# Patient Record
Sex: FEMALE | Race: WHITE | NOT HISPANIC OR LATINO | Employment: OTHER | URBAN - METROPOLITAN AREA
[De-identification: names, ages, dates, MRNs, and addresses within clinical notes are randomized per-mention and may not be internally consistent; named-entity substitution may affect disease eponyms.]

---

## 2022-09-15 ENCOUNTER — HOSPITAL ENCOUNTER (EMERGENCY)
Facility: CLINIC | Age: 87
Discharge: HOME | End: 2022-09-15
Attending: STUDENT IN AN ORGANIZED HEALTH CARE EDUCATION/TRAINING PROGRAM | Admitting: STUDENT IN AN ORGANIZED HEALTH CARE EDUCATION/TRAINING PROGRAM
Payer: COMMERCIAL

## 2022-09-15 VITALS
HEIGHT: 61 IN | BODY MASS INDEX: 21.71 KG/M2 | RESPIRATION RATE: 20 BRPM | HEART RATE: 109 BPM | SYSTOLIC BLOOD PRESSURE: 149 MMHG | OXYGEN SATURATION: 97 % | DIASTOLIC BLOOD PRESSURE: 86 MMHG | TEMPERATURE: 96.6 F | WEIGHT: 115 LBS

## 2022-09-15 DIAGNOSIS — R04.0 EPISTAXIS: ICD-10-CM

## 2022-09-15 LAB
ABO/RH(D): NORMAL
ANION GAP SERPL CALCULATED.3IONS-SCNC: 10 MMOL/L (ref 5–18)
ANTIBODY SCREEN: NEGATIVE
APTT PPP: 40 SECONDS (ref 22–38)
BASOPHILS # BLD AUTO: 0 10E3/UL (ref 0–0.2)
BASOPHILS NFR BLD AUTO: 0 %
BUN SERPL-MCNC: 16 MG/DL (ref 8–28)
CALCIUM SERPL-MCNC: 10.3 MG/DL (ref 8.5–10.5)
CHLORIDE BLD-SCNC: 103 MMOL/L (ref 98–107)
CO2 SERPL-SCNC: 25 MMOL/L (ref 22–31)
CREAT SERPL-MCNC: 0.81 MG/DL (ref 0.6–1.1)
EOSINOPHIL # BLD AUTO: 0 10E3/UL (ref 0–0.7)
EOSINOPHIL NFR BLD AUTO: 0 %
ERYTHROCYTE [DISTWIDTH] IN BLOOD BY AUTOMATED COUNT: 13.2 % (ref 10–15)
GFR SERPL CREATININE-BSD FRML MDRD: 71 ML/MIN/1.73M2
GLUCOSE BLD-MCNC: 102 MG/DL (ref 70–125)
HCT VFR BLD AUTO: 37.9 % (ref 35–47)
HGB BLD-MCNC: 12.7 G/DL (ref 11.7–15.7)
IMM GRANULOCYTES # BLD: 0.1 10E3/UL
IMM GRANULOCYTES NFR BLD: 1 %
INR PPP: 2.18 (ref 0.85–1.15)
LYMPHOCYTES # BLD AUTO: 0.9 10E3/UL (ref 0.8–5.3)
LYMPHOCYTES NFR BLD AUTO: 10 %
MCH RBC QN AUTO: 30.6 PG (ref 26.5–33)
MCHC RBC AUTO-ENTMCNC: 33.5 G/DL (ref 31.5–36.5)
MCV RBC AUTO: 91 FL (ref 78–100)
MONOCYTES # BLD AUTO: 0.7 10E3/UL (ref 0–1.3)
MONOCYTES NFR BLD AUTO: 8 %
NEUTROPHILS # BLD AUTO: 7.6 10E3/UL (ref 1.6–8.3)
NEUTROPHILS NFR BLD AUTO: 81 %
NRBC # BLD AUTO: 0 10E3/UL
NRBC BLD AUTO-RTO: 0 /100
PLATELET # BLD AUTO: 297 10E3/UL (ref 150–450)
POTASSIUM BLD-SCNC: 3.5 MMOL/L (ref 3.5–5)
RBC # BLD AUTO: 4.15 10E6/UL (ref 3.8–5.2)
SODIUM SERPL-SCNC: 138 MMOL/L (ref 136–145)
SPECIMEN EXPIRATION DATE: NORMAL
WBC # BLD AUTO: 9.4 10E3/UL (ref 4–11)

## 2022-09-15 PROCEDURE — 99284 EMERGENCY DEPT VISIT MOD MDM: CPT

## 2022-09-15 PROCEDURE — 86901 BLOOD TYPING SEROLOGIC RH(D): CPT | Performed by: STUDENT IN AN ORGANIZED HEALTH CARE EDUCATION/TRAINING PROGRAM

## 2022-09-15 PROCEDURE — 80048 BASIC METABOLIC PNL TOTAL CA: CPT | Performed by: STUDENT IN AN ORGANIZED HEALTH CARE EDUCATION/TRAINING PROGRAM

## 2022-09-15 PROCEDURE — 85730 THROMBOPLASTIN TIME PARTIAL: CPT | Performed by: STUDENT IN AN ORGANIZED HEALTH CARE EDUCATION/TRAINING PROGRAM

## 2022-09-15 PROCEDURE — 85610 PROTHROMBIN TIME: CPT | Performed by: STUDENT IN AN ORGANIZED HEALTH CARE EDUCATION/TRAINING PROGRAM

## 2022-09-15 PROCEDURE — 85004 AUTOMATED DIFF WBC COUNT: CPT | Performed by: STUDENT IN AN ORGANIZED HEALTH CARE EDUCATION/TRAINING PROGRAM

## 2022-09-15 PROCEDURE — 36415 COLL VENOUS BLD VENIPUNCTURE: CPT | Performed by: STUDENT IN AN ORGANIZED HEALTH CARE EDUCATION/TRAINING PROGRAM

## 2022-09-15 PROCEDURE — 30901 CONTROL OF NOSEBLEED: CPT | Mod: LT

## 2022-09-15 PROCEDURE — 250N000009 HC RX 250: Performed by: STUDENT IN AN ORGANIZED HEALTH CARE EDUCATION/TRAINING PROGRAM

## 2022-09-15 RX ORDER — OXYMETAZOLINE HYDROCHLORIDE 0.05 G/100ML
2 SPRAY NASAL ONCE
Status: COMPLETED | OUTPATIENT
Start: 2022-09-15 | End: 2022-09-15

## 2022-09-15 RX ORDER — TRANEXAMIC ACID 100 MG/ML
INJECTION, SOLUTION INTRAVENOUS ONCE
Status: COMPLETED | OUTPATIENT
Start: 2022-09-15 | End: 2022-09-15

## 2022-09-15 RX ORDER — LIDOCAINE HYDROCHLORIDE 20 MG/ML
10 SOLUTION OROPHARYNGEAL ONCE
Status: COMPLETED | OUTPATIENT
Start: 2022-09-15 | End: 2022-09-15

## 2022-09-15 RX ADMIN — LIDOCAINE HYDROCHLORIDE 10 ML: 20 SOLUTION ORAL at 11:42

## 2022-09-15 RX ADMIN — OXYMETAZOLINE HYDROCHLORIDE 2 SPRAY: 0.05 SPRAY NASAL at 11:42

## 2022-09-15 RX ADMIN — TRANEXAMIC ACID 1000 MG: 1 INJECTION, SOLUTION INTRAVENOUS at 11:41

## 2022-09-15 ASSESSMENT — ACTIVITIES OF DAILY LIVING (ADL): ADLS_ACUITY_SCORE: 33

## 2022-09-15 NOTE — ED PROVIDER NOTES
Emergency Department Encounter         FINAL IMPRESSION:  Epistaxis        ED COURSE AND MEDICAL DECISION MAKING   10:10 AM I met with the patient to gather history and to perform my initial exam. We discussed plans for the ED course, including diagnostic testing and treatment.    11:15 AM Rechecked and updated patient.   11:54 AM Rechecked and updated patient. Patient's nosebleed has stopped. I discussed plans for discharge with the patient, which they were agreeable to. We discussed supportive cares at home and reasons for return to the ER including new or worsening symptoms. All questions and concerns were addressed. Patient to be discharged by RN.       ED Course as of 09/15/22 1155   Thu Sep 15, 2022   1014 87-year-old on Coumadin here with nosebleed that began at 3 AM this morning approximately 7 hours prior to arrival.  Went to urgent care initially then sent her here because they did not had any capabilities to stop the nosebleed.  Arrival her bleeding has slowed.  Nasal clamp in place.  No signs of posterior bleed.  Clamp was taken down with no active bleeding.  We will watch her.  If she starts bleeding again, we will start topical medications.       -Cottonball soaked in Afrin, TXA and lidocaine placed in left nare.  Improvement and cessation of patient's nosebleed.  No signs of posterior bleed.  Multiple repeat evaluations of patient showing her resting comfortably.  Plan for ENT follow-up as an outpatient.  Repeat examination showing a small ulceration on the left middle half of the septum.  No active bleeding.  Offered cauterization patient declined.            At the conclusion of the encounter I discussed the results of all the tests and the disposition. The questions were answered. The patient or family acknowledged understanding and was agreeable with the care plan.              MEDICATIONS GIVEN IN THE EMERGENCY DEPARTMENT:  Medications   tranexamic acid (CYKLOKAPRON) TOPICAL (injection used  topically) (1,000 mg Topical Given 9/15/22 1141)   oxymetazoline (AFRIN) 0.05 % spray 2 spray (2 sprays Nasal Given 9/15/22 1142)   lidocaine (viscous) (XYLOCAINE) 2 % solution 10 mL (10 mLs Topical Given 9/15/22 1142)       NEW PRESCRIPTIONS STARTED AT TODAY'S ED VISIT:  New Prescriptions    No medications on file       HPI     Patient information obtained from: Patient    Use of Interpretor: N/A     Modesta Tejeda is a 87 year old female with a pertinent medical history of aortic stenosis, s/p aortic valve replacement, paroxysmal atrial fibrillation, essential HTN, HLD, who presents to this ED via walk-in for evaluation of epistaxis.     Per Chart Review, patient was seen at [facility] earlier today for evaluation of acute epistaxis, primarily from left nare approximately since 3:00 AM. Patient reported she is on Coumadin. Provider at Urgent Care attempted to get patient into ENT here in clinic for cautery but none were available, so patient was discharged with instructions to immediately visit an ED.    Patient endorses the history above. Patient reports that she has a nosebleed that began at 3 AM this morning (approximately 7 hours prior to arrival).  She states she went to Urgent Care initially and then was sent her here because they did not have any capabilities to stop the nosebleed. She denies any recent headache, dizziness, or any other complications at this time.       REVIEW OF SYSTEMS:  Review of Systems   Constitutional: Negative for fever, malaise  HEENT: Positive for nosebleeds. Negative runny nose, sore throat, ear pain, neck pain  Respiratory: Negative for shortness of breath, cough, congestion  Cardiovascular: Negative for chest pain, leg edema  Gastrointestinal: Negative for abdominal distention, abdominal pain, constipation, vomiting, nausea, diarrhea  Genitourinary: Negative for dysuria and hematuria.   Integument: Negative for rash, skin breakdown  Neurological: Negative for  "paresthesias, weakness, headache, dizziness.  Musculoskeletal: Negative for joint pain, joint swelling      All other systems reviewed and are negative.          MEDICAL HISTORY     History reviewed. No pertinent past medical history.    History reviewed. No pertinent surgical history.         No current outpatient medications on file.          PHYSICAL EXAM     BP (!) 149/86   Pulse 109   Temp (!) 96.6  F (35.9  C) (Temporal)   Resp 20   Ht 1.549 m (5' 1\")   Wt 52.2 kg (115 lb)   SpO2 97%   BMI 21.73 kg/m        PHYSICAL EXAM:     General: Patient appears well, nontoxic, comfortable  HEENT:  Nasal clamp in place. No signs of posterior bleed. Clamp was taken down with no active bleeding. Moist mucous membranes,  No head trauma.  No midline neck pain.  Small punctate ulceration noted on the mid half of the nasal septum.  Cardiovascular: Normal rate, normal rhythm, no extremity edema.  No appreciable murmur.  Respiratory: No signs of respiratory distress, lungs are clear to auscultation bilaterally with no wheezes rhonchi or rales.  Abdominal: Soft, nontender, nondistended, no palpable masses, no guarding, no rebound  Musculoskeletal: Full range of motion of joints, no deformities appreciated.  Neurological: Alert and oriented, grossly neurologically intact.  Psychological: Normal affect and mood.  Integument: No rashes appreciated          RESULTS       Labs Ordered and Resulted from Time of ED Arrival to Time of ED Departure   INR - Abnormal       Result Value    INR 2.18 (*)    PARTIAL THROMBOPLASTIN TIME - Abnormal    aPTT 40 (*)    BASIC METABOLIC PANEL - Normal    Sodium 138      Potassium 3.5      Chloride 103      Carbon Dioxide (CO2) 25      Anion Gap 10      Urea Nitrogen 16      Creatinine 0.81      Calcium 10.3      Glucose 102      GFR Estimate 71     CBC WITH PLATELETS AND DIFFERENTIAL    WBC Count 9.4      RBC Count 4.15      Hemoglobin 12.7      Hematocrit 37.9      MCV 91      MCH 30.6      " MCHC 33.5      RDW 13.2      Platelet Count 297      % Neutrophils 81      % Lymphocytes 10      % Monocytes 8      % Eosinophils 0      % Basophils 0      % Immature Granulocytes 1      NRBCs per 100 WBC 0      Absolute Neutrophils 7.6      Absolute Lymphocytes 0.9      Absolute Monocytes 0.7      Absolute Eosinophils 0.0      Absolute Basophils 0.0      Absolute Immature Granulocytes 0.1      Absolute NRBCs 0.0     TYPE AND SCREEN, ADULT   ABO/RH TYPE AND SCREEN       No orders to display           PROCEDURES:  Procedures:  [facility]    -Epistaxis Mgmt    Date/Time: 9/15/2022 10:53 AM  Performed by: Benji Cook DO  Authorized by: Benji Cook DO     Risks, benefits and alternatives discussed.      ANESTHESIA (see MAR for exact dosages)     Anesthesia method:  None  PROCEDURE DETAILS     Treatment site:  L anterior    Treatment complexity:  Limited    Treatment episode: recurrence of recent bleed        POST PROCEDURE     Assessment:  Bleeding stopped    PROCEDURE  Describe Procedure: TXA, Afrin and lidocaine soaked cottonball placed in left nare  Patient Tolerance:  Patient tolerated the procedure well with no immediate complications         I, Bala Pena am serving as a scribe to document services personally performed by Benji Cook DO, based on my observations and the provider's statements to me.  I, Benji Cook DO, attest that Bala Pena is acting in a scribe capacity, has observed my performance of the services and has documented them in accordance with my direction.    Benji Cook DO  Emergency Medicine  [facility] EMERGENCY ROOM       Benji Cook DO  09/15/22 1158

## 2023-04-10 ENCOUNTER — APPOINTMENT (OUTPATIENT)
Dept: CT IMAGING | Facility: CLINIC | Age: 88
End: 2023-04-10
Attending: EMERGENCY MEDICINE
Payer: COMMERCIAL

## 2023-04-10 ENCOUNTER — APPOINTMENT (OUTPATIENT)
Dept: RADIOLOGY | Facility: CLINIC | Age: 88
End: 2023-04-10
Attending: EMERGENCY MEDICINE
Payer: COMMERCIAL

## 2023-04-10 ENCOUNTER — HOSPITAL ENCOUNTER (EMERGENCY)
Facility: CLINIC | Age: 88
Discharge: HOME | End: 2023-04-10
Attending: EMERGENCY MEDICINE | Admitting: EMERGENCY MEDICINE
Payer: COMMERCIAL

## 2023-04-10 VITALS
BODY MASS INDEX: 24.3 KG/M2 | HEART RATE: 86 BPM | RESPIRATION RATE: 28 BRPM | OXYGEN SATURATION: 97 % | HEIGHT: 55 IN | SYSTOLIC BLOOD PRESSURE: 157 MMHG | TEMPERATURE: 97.6 F | DIASTOLIC BLOOD PRESSURE: 72 MMHG | WEIGHT: 105 LBS

## 2023-04-10 DIAGNOSIS — I71.43 INFRARENAL ABDOMINAL AORTIC ANEURYSM (AAA) WITHOUT RUPTURE (H): ICD-10-CM

## 2023-04-10 DIAGNOSIS — R10.9 FLANK PAIN: ICD-10-CM

## 2023-04-10 LAB
ALBUMIN SERPL-MCNC: 4.2 G/DL (ref 3.5–5)
ALBUMIN UR-MCNC: 30 MG/DL
ALP SERPL-CCNC: 45 U/L (ref 45–120)
ALT SERPL W P-5'-P-CCNC: 12 U/L (ref 0–45)
ANION GAP SERPL CALCULATED.3IONS-SCNC: 9 MMOL/L (ref 5–18)
APPEARANCE UR: CLEAR
AST SERPL W P-5'-P-CCNC: 12 U/L (ref 0–40)
ATRIAL RATE - MUSE: 90 BPM
BASOPHILS # BLD AUTO: 0 10E3/UL (ref 0–0.2)
BASOPHILS NFR BLD AUTO: 0 %
BILIRUB SERPL-MCNC: 1.2 MG/DL (ref 0–1)
BILIRUB UR QL STRIP: NEGATIVE
BUN SERPL-MCNC: 17 MG/DL (ref 8–28)
CALCIUM SERPL-MCNC: 9.9 MG/DL (ref 8.5–10.5)
CHLORIDE BLD-SCNC: 102 MMOL/L (ref 98–107)
CO2 SERPL-SCNC: 28 MMOL/L (ref 22–31)
COLOR UR AUTO: YELLOW
CREAT SERPL-MCNC: 0.72 MG/DL (ref 0.6–1.1)
DIASTOLIC BLOOD PRESSURE - MUSE: 88 MMHG
EOSINOPHIL # BLD AUTO: 0 10E3/UL (ref 0–0.7)
EOSINOPHIL NFR BLD AUTO: 0 %
ERYTHROCYTE [DISTWIDTH] IN BLOOD BY AUTOMATED COUNT: 13.2 % (ref 10–15)
GFR SERPL CREATININE-BSD FRML MDRD: 81 ML/MIN/1.73M2
GLUCOSE BLD-MCNC: 119 MG/DL (ref 70–125)
GLUCOSE UR STRIP-MCNC: NEGATIVE MG/DL
HCT VFR BLD AUTO: 40.4 % (ref 35–47)
HGB BLD-MCNC: 13 G/DL (ref 11.7–15.7)
HGB UR QL STRIP: NEGATIVE
HYALINE CASTS: 1 /LPF
IMM GRANULOCYTES # BLD: 0 10E3/UL
IMM GRANULOCYTES NFR BLD: 0 %
INR PPP: 2.08 (ref 0.85–1.15)
INTERPRETATION ECG - MUSE: NORMAL
KETONES UR STRIP-MCNC: 20 MG/DL
LEUKOCYTE ESTERASE UR QL STRIP: ABNORMAL
LIPASE SERPL-CCNC: 11 U/L (ref 0–52)
LYMPHOCYTES # BLD AUTO: 0.4 10E3/UL (ref 0.8–5.3)
LYMPHOCYTES NFR BLD AUTO: 6 %
MCH RBC QN AUTO: 30.6 PG (ref 26.5–33)
MCHC RBC AUTO-ENTMCNC: 32.2 G/DL (ref 31.5–36.5)
MCV RBC AUTO: 95 FL (ref 78–100)
MONOCYTES # BLD AUTO: 0.3 10E3/UL (ref 0–1.3)
MONOCYTES NFR BLD AUTO: 4 %
MUCOUS THREADS #/AREA URNS LPF: PRESENT /LPF
NEUTROPHILS # BLD AUTO: 6.4 10E3/UL (ref 1.6–8.3)
NEUTROPHILS NFR BLD AUTO: 90 %
NITRATE UR QL: NEGATIVE
NRBC # BLD AUTO: 0 10E3/UL
NRBC BLD AUTO-RTO: 0 /100
P AXIS - MUSE: NORMAL DEGREES
PH UR STRIP: 6.5 [PH] (ref 5–7)
PLATELET # BLD AUTO: 249 10E3/UL (ref 150–450)
POTASSIUM BLD-SCNC: 3.3 MMOL/L (ref 3.5–5)
PR INTERVAL - MUSE: NORMAL MS
PROT SERPL-MCNC: 6.7 G/DL (ref 6–8)
QRS DURATION - MUSE: 126 MS
QT - MUSE: 408 MS
QTC - MUSE: 504 MS
R AXIS - MUSE: -77 DEGREES
RBC # BLD AUTO: 4.25 10E6/UL (ref 3.8–5.2)
RBC URINE: 0 /HPF
SODIUM SERPL-SCNC: 139 MMOL/L (ref 136–145)
SP GR UR STRIP: 1.03 (ref 1–1.03)
SQUAMOUS EPITHELIAL: 1 /HPF
SYSTOLIC BLOOD PRESSURE - MUSE: 160 MMHG
T AXIS - MUSE: 91 DEGREES
TROPONIN I SERPL-MCNC: 0.01 NG/ML (ref 0–0.29)
UROBILINOGEN UR STRIP-MCNC: <2 MG/DL
VENTRICULAR RATE- MUSE: 92 BPM
WBC # BLD AUTO: 7.1 10E3/UL (ref 4–11)
WBC URINE: 2 /HPF

## 2023-04-10 PROCEDURE — 96360 HYDRATION IV INFUSION INIT: CPT | Mod: 59

## 2023-04-10 PROCEDURE — 36415 COLL VENOUS BLD VENIPUNCTURE: CPT | Performed by: EMERGENCY MEDICINE

## 2023-04-10 PROCEDURE — 99285 EMERGENCY DEPT VISIT HI MDM: CPT | Mod: 25

## 2023-04-10 PROCEDURE — 250N000013 HC RX MED GY IP 250 OP 250 PS 637: Performed by: EMERGENCY MEDICINE

## 2023-04-10 PROCEDURE — 84484 ASSAY OF TROPONIN QUANT: CPT | Performed by: EMERGENCY MEDICINE

## 2023-04-10 PROCEDURE — 71275 CT ANGIOGRAPHY CHEST: CPT

## 2023-04-10 PROCEDURE — 71046 X-RAY EXAM CHEST 2 VIEWS: CPT

## 2023-04-10 PROCEDURE — 74177 CT ABD & PELVIS W/CONTRAST: CPT

## 2023-04-10 PROCEDURE — 250N000011 HC RX IP 250 OP 636: Performed by: EMERGENCY MEDICINE

## 2023-04-10 PROCEDURE — 93005 ELECTROCARDIOGRAM TRACING: CPT | Performed by: EMERGENCY MEDICINE

## 2023-04-10 PROCEDURE — 258N000003 HC RX IP 258 OP 636: Performed by: EMERGENCY MEDICINE

## 2023-04-10 PROCEDURE — 81001 URINALYSIS AUTO W/SCOPE: CPT | Performed by: EMERGENCY MEDICINE

## 2023-04-10 PROCEDURE — 80053 COMPREHEN METABOLIC PANEL: CPT | Performed by: EMERGENCY MEDICINE

## 2023-04-10 PROCEDURE — 85004 AUTOMATED DIFF WBC COUNT: CPT | Performed by: EMERGENCY MEDICINE

## 2023-04-10 PROCEDURE — 85610 PROTHROMBIN TIME: CPT | Performed by: EMERGENCY MEDICINE

## 2023-04-10 PROCEDURE — 83690 ASSAY OF LIPASE: CPT | Performed by: EMERGENCY MEDICINE

## 2023-04-10 RX ORDER — LIDOCAINE 4 G/G
1 PATCH TOPICAL ONCE
Status: DISCONTINUED | OUTPATIENT
Start: 2023-04-10 | End: 2023-04-10 | Stop reason: HOSPADM

## 2023-04-10 RX ORDER — OXYCODONE HYDROCHLORIDE 5 MG/1
5 TABLET ORAL ONCE
Status: COMPLETED | OUTPATIENT
Start: 2023-04-10 | End: 2023-04-10

## 2023-04-10 RX ORDER — ACETAMINOPHEN 325 MG/1
650 TABLET ORAL ONCE
Status: COMPLETED | OUTPATIENT
Start: 2023-04-10 | End: 2023-04-10

## 2023-04-10 RX ORDER — IOPAMIDOL 755 MG/ML
100 INJECTION, SOLUTION INTRAVASCULAR ONCE
Status: COMPLETED | OUTPATIENT
Start: 2023-04-10 | End: 2023-04-10

## 2023-04-10 RX ORDER — CYCLOBENZAPRINE HCL 10 MG
10 TABLET ORAL 3 TIMES DAILY PRN
Qty: 15 TABLET | Refills: 0 | Status: SHIPPED | OUTPATIENT
Start: 2023-04-10 | End: 2024-02-08

## 2023-04-10 RX ADMIN — ACETAMINOPHEN 650 MG: 325 TABLET ORAL at 11:50

## 2023-04-10 RX ADMIN — SODIUM CHLORIDE 500 ML: 9 INJECTION, SOLUTION INTRAVENOUS at 12:22

## 2023-04-10 RX ADMIN — LIDOCAINE 1 PATCH: 246 PATCH TOPICAL at 14:31

## 2023-04-10 RX ADMIN — OXYCODONE HYDROCHLORIDE 5 MG: 5 TABLET ORAL at 13:31

## 2023-04-10 RX ADMIN — IOPAMIDOL 100 ML: 755 INJECTION, SOLUTION INTRAVENOUS at 13:09

## 2023-04-10 ASSESSMENT — ACTIVITIES OF DAILY LIVING (ADL)
ADLS_ACUITY_SCORE: 35

## 2023-04-10 ASSESSMENT — ENCOUNTER SYMPTOMS
VOMITING: 0
ABDOMINAL PAIN: 1
CHILLS: 0
DYSURIA: 0
FEVER: 0
DIARRHEA: 0
NAUSEA: 0

## 2023-04-10 NOTE — ED PROVIDER NOTES
Emergency Department Encounter     Evaluation Date & Time:   4/10/2023 11:00 AM    CHIEF COMPLAINT:  Flank Pain      Triage Note:     FINAL IMPRESSION:    ICD-10-CM    1. Flank pain  R10.9       2. Infrarenal abdominal aortic aneurysm (AAA) without rupture (H)  I71.43           Impression and Plan     ED COURSE & MEDICAL DECISION MAKIN:16 AM I met with patient for initial interview and encounter. PPE worn includes exam gloves and N95 mask.     ED Course as of 04/10/23 1444   Mon Apr 10, 2023   1217 She has right flank pain.  No clear cause of her pain on her x-ray.  It does seem to be musculoskeletal as she is tender to palpation in the area and there is no associated rash to suggest shingles.  However, there is no bruising or muscle spasm that is palpated there.  So, if her x-ray does not show an obvious fractured rib from her fall a week ago, I would pursue CT imaging to see if there is bleeding either around the liver or in that lower lung related to her warfarin usage.  Less likely that this is cardiac ischemia given the reproducible nature more likely it is musculoskeletal.  Blood work ordered to look at liver function and blood count as well as troponin for possible cardiac source.  If CT imaging is unremarkable then will likely discharge home with musculoskeletal flank pain.  Otherwise she has no infectious symptoms to suggest a pneumonia and no signs of fluid overload to suggest pleural effusion though spontaneous hemothorax would be possible even if related to her fall a week ago.  No midline spine pain to suggest spinal fracture and as reproducible to palpation unlikely to be due to radiculopathy from pinched nerve.   1438 I spoke with Dr. Steele with vascular surgery.  It discussed the CT findings.  Unlikely to be causing musculoskeletal pain that is tender to palpation and at her age with a small of an aneurysm no specific follow-up is needed just continue to work with her primary care  physician obviously on blood pressure control.  Since the oxycodone did not help her and only made her sleepy I think doing more muscle pain patches and muscle relaxer would be better.  Wrote for a prescription for Flexeril and she will follow-up as an outpatient.   1439 Her INR is pending but was normal 4 days ago.  She can continue to follow up with her anticoagulation clinic on that.   1444 Inr already back and good.  No changes needed.       At the conclusion of the encounter I discussed the results of all the tests and the disposition. The questions were answered. The patient or family acknowledged understanding and was agreeable with the care plan.          0 minutes of critical care time        MEDICATIONS GIVEN IN THE EMERGENCY DEPARTMENT:  Medications   Lidocaine (LIDOCARE) 4 % Patch 1 patch (1 patch Transdermal $Patch/Med Applied 4/10/23 1431)   lidocaine patch in PLACE (has no administration in time range)   0.9% sodium chloride BOLUS (0 mLs Intravenous Stopped 4/10/23 1317)   acetaminophen (TYLENOL) tablet 650 mg (650 mg Oral $Given 4/10/23 1150)   iopamidol (ISOVUE-370) solution 100 mL (100 mLs Intravenous $Given 4/10/23 1309)   oxyCODONE (ROXICODONE) tablet 5 mg (5 mg Oral $Given 4/10/23 1331)       NEW PRESCRIPTIONS STARTED AT TODAY'S ED VISIT:  New Prescriptions    CYCLOBENZAPRINE (FLEXERIL) 10 MG TABLET    Take 1 tablet (10 mg) by mouth 3 times daily as needed for muscle spasms       HPI     HPI     Modesta Tejeda is a 88 year old female with a pertinent history of GERD, HLD, paroxysmal Atrial fibrillation, s/p AVR, who presents to this ED via EMS for evaluation of abdominal pain.     The patient started experiencing right upper quadrant since yesterday.The pain wraps to her back and is gradually getting worse. She received 50 mcg fentanyl en route and another 25 mcg fentanyl when arrived to ED. She explains that the pain is subtle at rest but aggravates with movement such as getting up from a laying  "position. Her last bowel movement was yesterday which was normal. She is anticoagulated on Warfin, last INR check was on 03/06. Denies drinking alcohol. The patient reports she felled 2 weeks ago when her foot went the wrong way when she was trying to avoid a bookshelf. The patient denies dysuria, fever, chills, nausea, vomiting, diarrhea, and any other complaints or concerns at the moment.     REVIEW OF SYSTEMS:  Review of Systems   Constitutional: Negative for chills and fever.   Gastrointestinal: Positive for abdominal pain (RUQ radiating to back. Worse with movement). Negative for diarrhea, nausea and vomiting.   Genitourinary: Negative for dysuria.   All other systems reviewed and are negative.    remainder of systems are all otherwise negative.        Medical History     History reviewed. No pertinent past medical history.    History reviewed. No pertinent surgical history.    No family history on file.         cyclobenzaprine (FLEXERIL) 10 MG tablet        Physical Exam     First Vitals:  Patient Vitals for the past 24 hrs:   BP Temp Temp src Pulse Resp SpO2 Height Weight   04/10/23 1335 (!) 157/72 -- -- 86 28 97 % -- --   04/10/23 1330 136/66 -- -- 82 20 94 % -- --   04/10/23 1300 -- -- -- 84 -- 97 % -- --   04/10/23 1143 134/85 -- -- 88 20 97 % -- --   04/10/23 1126 -- -- -- -- -- -- 0.53 m (1' 8.87\") 47.6 kg (105 lb)   04/10/23 1113 (!) 150/90 97.6  F (36.4  C) Oral 84 20 98 % -- --   04/10/23 1110 139/77 -- -- -- -- -- -- --       PHYSICAL EXAM:   Constitutional:   Well-appearing, nontoxic, cooperative and drowsy.    HENT:  Normocephalic, wearing mask with normal voice.   Eyes:  PERRL, EOMI, Conjunctiva normal, No discharge, no scleral icterus.  Respiratory:  Breathing easily, clear lungs to ausculation.   Cardiovascular:  Irregular heart rate with rate controlled. nl s1s2 0 murmurs, rubs, or gallops.  Peripheral pulses dp, pt, and radial are wnl.  No peripheral edema   GI:  Tenderness to palpation over " lower posterior rib area. No midline tenderness, nondistended.  :No CVA tenderness.   Musculoskeletal:  Moves all extremities.  No erythematous or swollen major joints,   Integument:  Normal skin color, no bruises or rashes.   Lymphatic:  No cervical lymphadenopathy  Neurologic:  Alert & oriented x 3, Normal motor function, Normal sensory function, No focal deficits noted. Normal speech.  Psychiatric:  Affect normal, Judgment normal, Mood normal.     Results     LAB AND RADIOLOGY:  All pertinent labs reviewed and interpreted  Results for orders placed or performed during the hospital encounter of 04/10/23   XR Chest 2 Views     Status: None    Narrative    EXAM: XR CHEST 2 VIEWS  LOCATION: [facility]  DATE/TIME: 4/10/2023 12:01 PM    INDICATION: r posterior flank pain.  fall 1 week ago but pain only for 24 hours.  no cough infectious symptoms.  on blood thinner  COMPARISON: None.      Impression    IMPRESSION: The lungs are clear. The heart size and pulmonary vascularity are normal. No pneumothorax or pleural effusion. Postoperative change from median sternotomy and aortic valve replacement. Aortic root appears dilated. Degenerative change in the   thoracic spine with scoliosis at the thoracolumbar junction convex to the right.   CT Chest Pulmonary Embolism w Contrast     Status: None    Narrative    EXAM: CT CHEST PULMONARY EMBOLISM W CONTRAST, CT ABDOMEN PELVIS W CONTRAST  LOCATION: [facility]  DATE/TIME: 4/10/2023 1:20 PM    INDICATION: Right-sided chest and flank pain radiating to the back. 88-year-old woman who is anticoagulated on warfarin who had a fall several weeks ago.  COMPARISON: Today's 04/10/2023 chest radiograph.  TECHNIQUE: CT chest pulmonary angiogram and routine CT abdomen pelvis with IV contrast. Arterial phase through the chest and venous phase through the abdomen and pelvis. Multiplanar reformats and MIP reconstructions were performed. Dose  reduction   techniques were used.   CONTRAST: ISOVUE 370 100ML    FINDINGS:  ANGIOGRAM CHEST: Pulmonary arteries are normal caliber and negative for pulmonary emboli. Normal caliber thoracic aorta with no evidence of dissection. No CT evidence of right heart strain.     LUNGS AND PLEURA: Lungs are clear. No pleural effusion.    MEDIASTINUM/AXILLAE: No lymphadenopathy. Sternotomy. Mild biatrial cardiac enlargement with prior aortic valve replacement and moderate three-vessel coronary artery calcification. No pericardial effusion.    CORONARY ARTERY CALCIFICATION: Moderate coronary artery calcification.    HEPATOBILIARY: Liver is normal.  No calcified gallstones or bile duct dilatation.     PANCREAS: Normal.    SPLEEN: Spleen size normal.    ADRENAL GLANDS: Normal.    KIDNEY/BLADDER: Kidneys, ureters and bladder are normal.    BOWEL: Normal appendix. Colonic diverticulosis. Bowel is otherwise normal with no obstruction or inflammatory change. No free air. No free fluid.    LYMPH NODES: No lymphadenopathy.    VASCULATURE: Penetrating atheromatous ulcer right lateral wall mid infrarenal abdominal aorta with mild rightward focal saccular aneurysmal dilatation at about 3.0 cm ML, 2.5 cm AP over a length of 2.5 cm (series 6 axial images 70-77 and coronal images   30-33    PELVIC ORGANS: Hysterectomy. Pelvis otherwise unremarkable.    MUSCULOSKELETAL: No fracture or bone lesion. Bones are demineralized. Spondylotic change throughout the thoracolumbar spine.      Impression    IMPRESSION:   1.  Penetrating atheromatous ulcer right lateral wall mid infrarenal abdominal aorta with mild rightward focal saccular aneurysmal dilatation is of uncertain chronicity but could be acute/subacute. No CT sign of leakage/rupture. Vascular surgery   consultation and short interval follow-up dedicated arterial CTA without and with contrast material recommended. Discussed with Dr. Brito 1:55 PM 04/10/2023.  2.  No pulmonary emboli.  3.   Mild biatrial cardiac enlargement with prior aortic valve replacement and moderate three-vessel coronary artery calcification.   CT Abdomen Pelvis w Contrast     Status: None    Narrative    EXAM: CT CHEST PULMONARY EMBOLISM W CONTRAST, CT ABDOMEN PELVIS W CONTRAST  LOCATION: [facility]  DATE/TIME: 4/10/2023 1:20 PM    INDICATION: Right-sided chest and flank pain radiating to the back. 88-year-old woman who is anticoagulated on warfarin who had a fall several weeks ago.  COMPARISON: Today's 04/10/2023 chest radiograph.  TECHNIQUE: CT chest pulmonary angiogram and routine CT abdomen pelvis with IV contrast. Arterial phase through the chest and venous phase through the abdomen and pelvis. Multiplanar reformats and MIP reconstructions were performed. Dose reduction   techniques were used.   CONTRAST: ISOVUE 370 100ML    FINDINGS:  ANGIOGRAM CHEST: Pulmonary arteries are normal caliber and negative for pulmonary emboli. Normal caliber thoracic aorta with no evidence of dissection. No CT evidence of right heart strain.     LUNGS AND PLEURA: Lungs are clear. No pleural effusion.    MEDIASTINUM/AXILLAE: No lymphadenopathy. Sternotomy. Mild biatrial cardiac enlargement with prior aortic valve replacement and moderate three-vessel coronary artery calcification. No pericardial effusion.    CORONARY ARTERY CALCIFICATION: Moderate coronary artery calcification.    HEPATOBILIARY: Liver is normal.  No calcified gallstones or bile duct dilatation.     PANCREAS: Normal.    SPLEEN: Spleen size normal.    ADRENAL GLANDS: Normal.    KIDNEY/BLADDER: Kidneys, ureters and bladder are normal.    BOWEL: Normal appendix. Colonic diverticulosis. Bowel is otherwise normal with no obstruction or inflammatory change. No free air. No free fluid.    LYMPH NODES: No lymphadenopathy.    VASCULATURE: Penetrating atheromatous ulcer right lateral wall mid infrarenal abdominal aorta with mild rightward focal saccular  aneurysmal dilatation at about 3.0 cm ML, 2.5 cm AP over a length of 2.5 cm (series 6 axial images 70-77 and coronal images   30-33    PELVIC ORGANS: Hysterectomy. Pelvis otherwise unremarkable.    MUSCULOSKELETAL: No fracture or bone lesion. Bones are demineralized. Spondylotic change throughout the thoracolumbar spine.      Impression    IMPRESSION:   1.  Penetrating atheromatous ulcer right lateral wall mid infrarenal abdominal aorta with mild rightward focal saccular aneurysmal dilatation is of uncertain chronicity but could be acute/subacute. No CT sign of leakage/rupture. Vascular surgery   consultation and short interval follow-up dedicated arterial CTA without and with contrast material recommended. Discussed with Dr. Brito 1:55 PM 04/10/2023.  2.  No pulmonary emboli.  3.  Mild biatrial cardiac enlargement with prior aortic valve replacement and moderate three-vessel coronary artery calcification.   Comprehensive metabolic panel     Status: Abnormal   Result Value Ref Range    Sodium 139 136 - 145 mmol/L    Potassium 3.3 (L) 3.5 - 5.0 mmol/L    Chloride 102 98 - 107 mmol/L    Carbon Dioxide (CO2) 28 22 - 31 mmol/L    Anion Gap 9 5 - 18 mmol/L    Urea Nitrogen 17 8 - 28 mg/dL    Creatinine 0.72 0.60 - 1.10 mg/dL    Calcium 9.9 8.5 - 10.5 mg/dL    Glucose 119 70 - 125 mg/dL    Alkaline Phosphatase 45 45 - 120 U/L    AST 12 0 - 40 U/L    ALT 12 0 - 45 U/L    Protein Total 6.7 6.0 - 8.0 g/dL    Albumin 4.2 3.5 - 5.0 g/dL    Bilirubin Total 1.2 (H) 0.0 - 1.0 mg/dL    GFR Estimate 81 >60 mL/min/1.73m2   Lipase     Status: Normal   Result Value Ref Range    Lipase 11 0 - 52 U/L   Troponin I     Status: Normal   Result Value Ref Range    Troponin I 0.01 0.00 - 0.29 ng/mL   CBC with platelets and differential     Status: Abnormal   Result Value Ref Range    WBC Count 7.1 4.0 - 11.0 10e3/uL    RBC Count 4.25 3.80 - 5.20 10e6/uL    Hemoglobin 13.0 11.7 - 15.7 g/dL    Hematocrit 40.4 35.0 - 47.0 %    MCV 95 78 - 100  fL    MCH 30.6 26.5 - 33.0 pg    MCHC 32.2 31.5 - 36.5 g/dL    RDW 13.2 10.0 - 15.0 %    Platelet Count 249 150 - 450 10e3/uL    % Neutrophils 90 %    % Lymphocytes 6 %    % Monocytes 4 %    % Eosinophils 0 %    % Basophils 0 %    % Immature Granulocytes 0 %    NRBCs per 100 WBC 0 <1 /100    Absolute Neutrophils 6.4 1.6 - 8.3 10e3/uL    Absolute Lymphocytes 0.4 (L) 0.8 - 5.3 10e3/uL    Absolute Monocytes 0.3 0.0 - 1.3 10e3/uL    Absolute Eosinophils 0.0 0.0 - 0.7 10e3/uL    Absolute Basophils 0.0 0.0 - 0.2 10e3/uL    Absolute Immature Granulocytes 0.0 <=0.4 10e3/uL    Absolute NRBCs 0.0 10e3/uL   UA with Microscopic reflex to Culture     Status: Abnormal    Specimen: Urine, NOS   Result Value Ref Range    Color Urine Yellow Colorless, Straw, Light Yellow, Yellow    Appearance Urine Clear Clear    Glucose Urine Negative Negative mg/dL    Bilirubin Urine Negative Negative    Ketones Urine 20 (A) Negative mg/dL    Specific Gravity Urine 1.027 1.001 - 1.030    Blood Urine Negative Negative    pH Urine 6.5 5.0 - 7.0    Protein Albumin Urine 30 (A) Negative mg/dL    Urobilinogen Urine <2.0 <2.0 mg/dL    Nitrite Urine Negative Negative    Leukocyte Esterase Urine 25 Pete/uL (A) Negative    Mucus Urine Present (A) None Seen /LPF    RBC Urine 0 <=2 /HPF    WBC Urine 2 <=5 /HPF    Squamous Epithelials Urine 1 <=1 /HPF    Hyaline Casts Urine 1 <=2 /LPF    Narrative    Urine Culture not indicated   INR     Status: Abnormal   Result Value Ref Range    INR 2.08 (H) 0.85 - 1.15   CBC with platelets differential     Status: Abnormal    Narrative    The following orders were created for panel order CBC with platelets differential.  Procedure                               Abnormality         Status                     ---------                               -----------         ------                     CBC with platelets and d...[[id number]]  Abnormal            Final result                 Please view results for these tests on the  individual orders.         ECG:    Performed at: 1133    Impression: atrial fibrillation rate of 92, lad, lbbb, no previous ekg available for comparison.  Pr * qrs 126ms, qtc 504ms, prt axes * -77 91.    I have independently reviewed and interpreted the EKS(s) documented above    PROCEDURES:  Procedures:      [facility] Documentation     Medical Decision Making    History:    Supplemental history from: Documented in chart, if applicable    External Record(s) reviewed: Documented in chart, if applicable.    Work Up:    Chart documentation includes differential considered and any EKGs or imaging independently interpreted by provider, where specified.    In additional to work up documented, I considered the following work up: Documented in chart, if applicable.    External consultation:    Discussion of management with another provider: Documented in chart, if applicable    Complicating factors:    Care impacted by chronic illness: Anticoagulated State    Care affected by social determinants of health: N/A    Disposition considerations: Discharge. I prescribed additional prescription strength medication(s) as charted. See documentation for any additional details.      The creation of this record is based on the scribe s observations of the work being performed by Diane Her and the provider s statements to them. This document has been checked and approved by MD Nai Kelly MD  Emergency Medicine  [facility] EMERGENCY ROOM         Nai Brito MD  04/10/23 3296

## 2023-04-10 NOTE — ED TRIAGE NOTES
Patient arrived to department with RUQ pain that radiates to the back.    Was given 75 mcg en route and an additional 25 mg while in our ambulance garage.  pmh afib with MI in the past.  [facility], pain started on Sunday

## 2023-04-10 NOTE — ED NOTES
Blood collected from existing IV.10 ml of blood collected and wasted.  Blood collected for test and sent to the lab.  IV flushed with 10 ml of NS.  Genevieve Meza RN 4/10/2023 2:24 PM

## 2023-04-10 NOTE — DISCHARGE INSTRUCTIONS
I would continue taking acetaminophen for the pain.  Then, if the lidocaine patch helps use over-the-counter muscle pain patches that have a numbing medicine in them.    Since the oxycodone did not help you very much and this seems to be more muscular pain, I would recommend using Flexeril for the pain.    Follow-up with your doctor in a couple of days for repeat evaluation and treatment.    You do have the aneurysm.  We did discuss this with our on-call vascular team who specializes in these problems and this is unlikely to be causing your problem.  Generally treatment is to make sure that your primary care doctor is still managing your blood pressure well enough.  It will help to get an idea of what your blood pressures are at home so I would recommend checking them twice a day write down the numbers and discuss them with your primary care physician also.    I would wait until you are no longer sleepy from the oxycodone before taking your first dose of Flexeril.

## 2024-02-07 ENCOUNTER — LAB REQUISITION (OUTPATIENT)
Dept: LAB | Facility: CLINIC | Age: 89
End: 2024-02-07
Payer: COMMERCIAL

## 2024-02-07 DIAGNOSIS — I48.91 UNSPECIFIED ATRIAL FIBRILLATION (H): ICD-10-CM

## 2024-02-08 ENCOUNTER — TRANSITIONAL CARE UNIT VISIT (OUTPATIENT)
Dept: GERIATRICS | Facility: CLINIC | Age: 89
End: 2024-02-08
Payer: COMMERCIAL

## 2024-02-08 ENCOUNTER — DOCUMENTATION ONLY (OUTPATIENT)
Dept: GERIATRICS | Facility: CLINIC | Age: 89
End: 2024-02-08

## 2024-02-08 ENCOUNTER — TELEPHONE (OUTPATIENT)
Dept: GERIATRICS | Facility: CLINIC | Age: 89
End: 2024-02-08

## 2024-02-08 VITALS
TEMPERATURE: 98 F | OXYGEN SATURATION: 98 % | HEART RATE: 99 BPM | HEIGHT: 60 IN | WEIGHT: 105 LBS | DIASTOLIC BLOOD PRESSURE: 80 MMHG | BODY MASS INDEX: 20.62 KG/M2 | SYSTOLIC BLOOD PRESSURE: 138 MMHG | RESPIRATION RATE: 16 BRPM

## 2024-02-08 DIAGNOSIS — I10 ESSENTIAL HYPERTENSION: ICD-10-CM

## 2024-02-08 DIAGNOSIS — I48.0 PAROXYSMAL ATRIAL FIBRILLATION (H): ICD-10-CM

## 2024-02-08 DIAGNOSIS — Z95.2 S/P AVR (AORTIC VALVE REPLACEMENT): ICD-10-CM

## 2024-02-08 DIAGNOSIS — Z98.890 STATUS POST ENDOVASCULAR ANEURYSM REPAIR (EVAR): ICD-10-CM

## 2024-02-08 DIAGNOSIS — I35.0 AORTIC VALVE STENOSIS, ETIOLOGY OF CARDIAC VALVE DISEASE UNSPECIFIED: Primary | ICD-10-CM

## 2024-02-08 DIAGNOSIS — Z79.01 ANTICOAGULATION MONITORING, INR RANGE 2-3: ICD-10-CM

## 2024-02-08 DIAGNOSIS — Z86.79 STATUS POST ENDOVASCULAR ANEURYSM REPAIR (EVAR): ICD-10-CM

## 2024-02-08 DIAGNOSIS — K55.9 ISCHEMIC COLITIS (H): ICD-10-CM

## 2024-02-08 DIAGNOSIS — D62 ABLA (ACUTE BLOOD LOSS ANEMIA): ICD-10-CM

## 2024-02-08 DIAGNOSIS — Z90.49 HISTORY OF PARTIAL COLECTOMY: ICD-10-CM

## 2024-02-08 PROBLEM — N17.0 ACUTE RENAL FAILURE WITH TUBULAR NECROSIS (H): Status: ACTIVE | Noted: 2024-01-16

## 2024-02-08 PROBLEM — D64.9 NORMOCYTIC ANEMIA: Status: ACTIVE | Noted: 2024-01-11

## 2024-02-08 PROBLEM — E87.6 HYPOKALEMIA: Status: ACTIVE | Noted: 2024-01-11

## 2024-02-08 PROBLEM — R57.9 SHOCK (H): Status: RESOLVED | Noted: 2024-01-16 | Resolved: 2024-02-08

## 2024-02-08 PROBLEM — R57.9 SHOCK (H): Status: ACTIVE | Noted: 2024-01-16

## 2024-02-08 LAB — INR PPP: 1.64 (ref 0.85–1.15)

## 2024-02-08 PROCEDURE — 99305 1ST NF CARE MODERATE MDM 35: CPT | Performed by: FAMILY MEDICINE

## 2024-02-08 PROCEDURE — 85610 PROTHROMBIN TIME: CPT | Mod: ORL | Performed by: FAMILY MEDICINE

## 2024-02-08 PROCEDURE — 36415 COLL VENOUS BLD VENIPUNCTURE: CPT | Mod: ORL | Performed by: FAMILY MEDICINE

## 2024-02-08 PROCEDURE — P9603 ONE-WAY ALLOW PRORATED MILES: HCPCS | Mod: ORL | Performed by: FAMILY MEDICINE

## 2024-02-08 RX ORDER — AMOXICILLIN 250 MG
2 CAPSULE ORAL 2 TIMES DAILY
COMMUNITY

## 2024-02-08 RX ORDER — HYDROXYZINE HYDROCHLORIDE 25 MG/1
25 TABLET, FILM COATED ORAL EVERY 6 HOURS PRN
COMMUNITY
End: 2024-02-27

## 2024-02-08 RX ORDER — POTASSIUM CHLORIDE 1.5 G/1.58G
20 POWDER, FOR SOLUTION ORAL DAILY
COMMUNITY

## 2024-02-08 RX ORDER — NITROGLYCERIN 0.4 MG/1
0.4 TABLET SUBLINGUAL EVERY 5 MIN PRN
COMMUNITY

## 2024-02-08 RX ORDER — ACETAMINOPHEN 325 MG/1
1000 TABLET ORAL 2 TIMES DAILY
COMMUNITY

## 2024-02-08 RX ORDER — ASPIRIN 81 MG/1
81 TABLET ORAL DAILY
COMMUNITY

## 2024-02-08 RX ORDER — SIMVASTATIN 10 MG
10 TABLET ORAL AT BEDTIME
COMMUNITY

## 2024-02-08 RX ORDER — FAMOTIDINE 40 MG/1
40 TABLET, FILM COATED ORAL DAILY
COMMUNITY

## 2024-02-08 RX ORDER — TRAZODONE HYDROCHLORIDE 50 MG/1
25 TABLET, FILM COATED ORAL DAILY PRN
COMMUNITY
End: 2024-02-27

## 2024-02-08 NOTE — LETTER
2/8/2024        RE: Modesta Tejeda  [address]  [facility] [place] [address]        [facility] GERIATRIC SERVICES       Patient Modesta Tejeda  MRN: [id number]        Reason for Visit     Chief Complaint   Patient presents with     Hospital F/U       Code Status     DNR only    Assessment/ plan     Infrarenal AAA with penetrating aortic ulcer with enlarging saccular aneurysm status post EVAR procedure on 1/11/2024  Incision is healed    Status post surgical evaluation for hemorrhage from the proximal aspect of the EVAR with her emergent repair of the graft on 1/12/2024    Status post abdominal washout and fascial closure with removal of wound VAC    Acute ischemic colitis status post sigmoid colectomy with placement of end colostomy on 1/14/2024  Patient required TPN in the hospital  NG tube has been removed  Discharge on p.o. intake;supplements given due to poor intake    Generalized anasarca/CHF-monitor wt    Bilateral pleural effusion status post thoracentesis    A-fib with rapid ventricular response  On warfarin monitor INRs    Acute respiratory failure postoperatively currently resolved status post bronchoscopy on 1/15/2024 and 1/20/2024    ABLA recheck hemoglobin    Aortic stenosis status post AVR on anticoagulation    Hypertension was on HCTZ and lisinopril  Meds were held in the hospital due to low blood pressures noted  Monitor BP-not on any meds    GERD continue PPI    Hyperlipidemia continue with her statins    Acute delirium/suspected cognitive impairment patient has minimal recall of recent events.  She has been taken off narcotics  Will monitor cognitive status closely    PCM-ON supplements  Generalized weakness-cont PT  Remains weak and bed bound  Family wants to minimize interventions in future        History     Patient is a very pleasant 88 year old female who is admitted to TCU  Patient was electively admitted with history of infrarenal AAA and enlarging saccular aneurysm who  underwent elective aortic endovascular repair on 1/11/2024  Postoperative course complicated by A-fib with rapid ventricular response requiring initiation of amiodarone  In addition she had worsening abdominal pain and required I&D for evaluation of hemorrhage from proximal aspect of her repair on 1/12/2024  She required repeat OR intervention on 1/14/2024 with abdominal washout and fascial closure with removal of wound VAC  Subsequently she was noted to have ischemic changes in her sigmoid colon and underwent sigmoid colectomy with descending end colostomy    She had bronchoscopy on 1/15/2024 and eventually was extubated  Noted to have anasarca with bilateral pleural effusion and underwent thoracentesis  Patient is confused and a poor historian and family has elected DNR CODE STATUS      Past Medical & Surgical History     Htn  Gerd  A fib      Past Social History     Reviewed,      Family History     Reviewed, and family history is not on file.    Medication List     Current Outpatient Medications   Medication     acetaminophen (TYLENOL) 325 MG tablet     aspirin 81 MG EC tablet     Calcium Carb-Cholecalciferol (CALCIUM 500 + D) 500-3.125 MG-MCG TABS     famotidine (PEPCID) 40 MG tablet     hydrOXYzine HCl (ATARAX) 25 MG tablet     melatonin 5 MG tablet     nitroGLYcerin (NITROSTAT) 0.4 MG sublingual tablet     potassium chloride (KLOR-CON) 20 MEQ packet     senna-docusate (SENOKOT-S/PERICOLACE) 8.6-50 MG tablet     simvastatin (ZOCOR) 10 MG tablet     traZODone (DESYREL) 50 MG tablet     cyclobenzaprine (FLEXERIL) 10 MG tablet     No current facility-administered medications for this visit.      MED REC REQUIRED  Post Medication Reconciliation Status:  Discharge medications reconciled, continue medications without change       Allergies     No Known Allergies    Review of Systems   A comprehensive review of 14 systems was done. Pertinent findings noted here and in history of present illness. All the rest  negative.  Constitutional: Negative.  Negative for fever, chills, she has  activity change, appetite change and fatigue.   Eating poorly  HENT: Negative for congestion and facial swelling.    Eyes: Negative for photophobia, redness and visual disturbance.   Respiratory: Negative for cough and chest tightness.    Cardiovascular: Negative for chest pain, palpitations and leg swelling.   Gastrointestinal: Negative for nausea, diarrhea, constipation, blood in stool and abdominal distention.   Genitourinary: Negative.    Musculoskeletal: Negative.    Skin: Negative.    Neurological: Negative for dizziness, tremors, syncope, weakness, light-headedness and headaches.   Hematological: Does not bruise/bleed easily.   Psychiatric/Behavioral: Negative.  Limited recall      Physical Exam   /80   Pulse 99   Temp 98  F (36.7  C)   Resp 16   Ht 1.524 m (5')   Wt 47.6 kg (105 lb)   SpO2 98%   BMI 20.51 kg/m       Constitutional: Oriented to person, place,  and appears well-developed.   Frail and weak  HEENT:  Normocephalic and atraumatic.  Eyes: Conjunctivae and EOM are normal. Pupils are equal, round, and reactive to light. No discharge.  No scleral icterus. Nose normal. Mouth/Throat: Oropharynx is clear and moist. No oropharyngeal exudate.    NECK: Normal range of motion. Neck supple. No JVD present. No tracheal deviation present. No thyromegaly present.   CARDIOVASCULAR: Normal rate, regular rhythm and intact distal pulses.  Exam reveals no gallop and no friction rub.  Systolic murmur present.  PULMONARY: Effort normal and breath sounds normal. No respiratory distress.No Wheezing or rales.  ABDOMEN: Soft. Bowel sounds are normal. No distension and no mass.  There is no tenderness. There is no rebound and no guarding. No HSM.  Midline incision noted   Colostomy noted  MUSCULOSKELETAL: Normal range of motion. Mild kyphosis, no tenderness.  LYMPH NODES: Has no cervical, supraclavicular, axillary and groin adenopathy.    NEUROLOGICAL: Alert and oriented to person, place, . No cranial nerve deficit.  Normal muscle tone. Coordination normal.   GENITOURINARY: Deferred exam.  SKIN: Skin is warm and dry. No rash noted. No erythema. No pallor.   EXTREMITIES: No cyanosis, no clubbing, no edema. No Deformity.  PSYCHIATRIC: Normal mood, affect and behavior.  Limited recall      Lab Results       WBC -- -- -- 11.0 -- 17.5 H  RBC -- -- -- 2.55 L -- 2.56 L  HGB -- 7.8 L -- 7.7 L -- 7.9 L  HCT -- -- -- 24.4 L -- 24.2 L  MCV -- 98 -- 96 -- 95  MCH -- -- -- 30.2 -- 30.9  MCHC -- -- -- 31.6 L -- 32.6   -- 445 H 446 H < > 499 H  MPV 8.5 -- 8.8 9.2 < > 9.2             Electronically signed by    Jessica Cooper MD                            Sincerely,        ALTA Silva

## 2024-02-08 NOTE — PROGRESS NOTES
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 intake;supplements given due to poor intake    Generalized anasarca/CHF-monitor wt    Bilateral pleural effusion status post thoracentesis    A-fib with rapid ventricular response  On warfarin monitor INRs    Acute respiratory failure postoperatively currently resolved status post bronchoscopy on 1/15/2024 and 1/20/2024    ABLA recheck hemoglobin    Aortic stenosis status post AVR on anticoagulation    Hypertension was on HCTZ and lisinopril  Meds were held in the hospital due to low blood pressures noted  Monitor BP-not on any meds    GERD continue PPI    Hyperlipidemia continue with her statins    Acute delirium/suspected cognitive impairment patient has minimal recall of recent events.  She has been taken off narcotics  Will monitor cognitive status closely    PCM-ON supplements  Generalized weakness-cont PT  Remains weak and bed bound  Family wants to minimize interventions in future        History     Patient is a very pleasant 88 year old female who is admitted to TCU  Patient was electively admitted with history of infrarenal AAA and enlarging saccular aneurysm who underwent elective aortic endovascular repair on 1/11/2024  Postoperative course complicated by A-fib with  rapid ventricular response requiring initiation of amiodarone  In addition she had worsening abdominal pain and required I&D for evaluation of hemorrhage from proximal aspect of her repair on 1/12/2024  She required repeat OR intervention on 1/14/2024 with abdominal washout and fascial closure with removal of wound VAC  Subsequently she was noted to have ischemic changes in her sigmoid colon and underwent sigmoid colectomy with descending end colostomy    She had bronchoscopy on 1/15/2024 and eventually was extubated  Noted to have anasarca with bilateral pleural effusion and underwent thoracentesis  Patient is confused and a poor historian and family has elected DNR CODE STATUS      Past Medical & Surgical History     Htn  Gerd  A fib      Past Social History     Reviewed,      Family History     Reviewed, and family history is not on file.    Medication List     Current Outpatient Medications   Medication    acetaminophen (TYLENOL) 325 MG tablet    aspirin 81 MG EC tablet    Calcium Carb-Cholecalciferol (CALCIUM 500 + D) 500-3.125 MG-MCG TABS    famotidine (PEPCID) 40 MG tablet    hydrOXYzine HCl (ATARAX) 25 MG tablet    melatonin 5 MG tablet    nitroGLYcerin (NITROSTAT) 0.4 MG sublingual tablet    potassium chloride (KLOR-CON) 20 MEQ packet    senna-docusate (SENOKOT-S/PERICOLACE) 8.6-50 MG tablet    simvastatin (ZOCOR) 10 MG tablet    traZODone (DESYREL) 50 MG tablet    cyclobenzaprine (FLEXERIL) 10 MG tablet     No current facility-administered medications for this visit.      MED REC REQUIRED  Post Medication Reconciliation Status:  Discharge medications reconciled, continue medications without change       Allergies     No Known Allergies    Review of Systems   A comprehensive review of 14 systems was done. Pertinent findings noted here and in history of present illness. All the rest negative.  Constitutional: Negative.  Negative for fever, chills, she has  activity change, appetite change and fatigue.    Eating poorly  HENT: Negative for congestion and facial swelling.    Eyes: Negative for photophobia, redness and visual disturbance.   Respiratory: Negative for cough and chest tightness.    Cardiovascular: Negative for chest pain, palpitations and leg swelling.   Gastrointestinal: Negative for nausea, diarrhea, constipation, blood in stool and abdominal distention.   Genitourinary: Negative.    Musculoskeletal: Negative.    Skin: Negative.    Neurological: Negative for dizziness, tremors, syncope, weakness, light-headedness and headaches.   Hematological: Does not bruise/bleed easily.   Psychiatric/Behavioral: Negative.  Limited recall      Physical Exam   /80   Pulse 99   Temp 98  F (36.7  C)   Resp 16   Ht 1.524 m (5')   Wt 47.6 kg (105 lb)   SpO2 98%   BMI 20.51 kg/m       Constitutional: Oriented to person, place,  and appears well-developed.   Frail and weak  HEENT:  Normocephalic and atraumatic.  Eyes: Conjunctivae and EOM are normal. Pupils are equal, round, and reactive to light. No discharge.  No scleral icterus. Nose normal. Mouth/Throat: Oropharynx is clear and moist. No oropharyngeal exudate.    NECK: Normal range of motion. Neck supple. No JVD present. No tracheal deviation present. No thyromegaly present.   CARDIOVASCULAR: Normal rate, regular rhythm and intact distal pulses.  Exam reveals no gallop and no friction rub.  Systolic murmur present.  PULMONARY: Effort normal and breath sounds normal. No respiratory distress.No Wheezing or rales.  ABDOMEN: Soft. Bowel sounds are normal. No distension and no mass.  There is no tenderness. There is no rebound and no guarding. No HSM.  Midline incision noted   Colostomy noted  MUSCULOSKELETAL: Normal range of motion. Mild kyphosis, no tenderness.  LYMPH NODES: Has no cervical, supraclavicular, axillary and groin adenopathy.   NEUROLOGICAL: Alert and oriented to person, place, . No cranial nerve deficit.  Normal muscle tone. Coordination normal.    GENITOURINARY: Deferred exam.  SKIN: Skin is warm and dry. No rash noted. No erythema. No pallor.   EXTREMITIES: No cyanosis, no clubbing, no edema. No Deformity.  PSYCHIATRIC: Normal mood, affect and behavior.  Limited recall      Lab Results       WBC -- -- -- 11.0 -- 17.5 H  RBC -- -- -- 2.55 L -- 2.56 L  HGB -- 7.8 L -- 7.7 L -- 7.9 L  HCT -- -- -- 24.4 L -- 24.2 L  MCV -- 98 -- 96 -- 95  MCH -- -- -- 30.2 -- 30.9  MCHC -- -- -- 31.6 L -- 32.6   -- 445 H 446 H < > 499 H  MPV 8.5 -- 8.8 9.2 < > 9.2             Electronically signed by    Jessica Cooper MD

## 2024-02-08 NOTE — TELEPHONE ENCOUNTER
[facility] Geriatrics Triage Nurse INR     Provider: Jessica Cooper MD  Facility: [facility]   Facility Type:  TCU    Caller: Katie  Call Back Number: [phone]  Reason for call: INR  Diagnosis/Goal: A. Fib    Todays INR: 1.64  Last INR 2/7 1.4(5mg).  Home dose:  7.5mg Tues and Sat and 5mg AOD    Heparin/Lovenox:  No  Currently on ABX?: No  Other interacting medication:  ASA  Missed or refused doses: No    Verbal Order/Direction given by Provider: Warfarin 6mg daily.  Next INR 2/12/24.      Provider Giving Order:  Jessica Cooper MD    Verbal Order given to: Katie by Alanis Hicks, RN on 2/8/24      Brandyn Damon RN

## 2024-02-09 ENCOUNTER — LAB REQUISITION (OUTPATIENT)
Dept: LAB | Facility: CLINIC | Age: 89
End: 2024-02-09
Payer: COMMERCIAL

## 2024-02-09 DIAGNOSIS — Z48.812 ENCOUNTER FOR SURGICAL AFTERCARE FOLLOWING SURGERY ON THE CIRCULATORY SYSTEM: ICD-10-CM

## 2024-02-09 DIAGNOSIS — I48.91 UNSPECIFIED ATRIAL FIBRILLATION (H): ICD-10-CM

## 2024-02-12 ENCOUNTER — TELEPHONE (OUTPATIENT)
Dept: GERIATRICS | Facility: CLINIC | Age: 89
End: 2024-02-12

## 2024-02-12 LAB
ANION GAP SERPL CALCULATED.3IONS-SCNC: 13 MMOL/L (ref 7–15)
BUN SERPL-MCNC: 13.4 MG/DL (ref 8–23)
CALCIUM SERPL-MCNC: 9.1 MG/DL (ref 8.8–10.2)
CHLORIDE SERPL-SCNC: 107 MMOL/L (ref 98–107)
CREAT SERPL-MCNC: 0.93 MG/DL (ref 0.51–0.95)
DEPRECATED HCO3 PLAS-SCNC: 20 MMOL/L (ref 22–29)
EGFRCR SERPLBLD CKD-EPI 2021: 60 ML/MIN/1.73M2
GLUCOSE SERPL-MCNC: 114 MG/DL (ref 70–99)
HGB BLD-MCNC: 10.3 G/DL (ref 11.7–15.7)
INR PPP: 2.46 (ref 0.85–1.15)
POTASSIUM SERPL-SCNC: 3.9 MMOL/L (ref 3.4–5.3)
SODIUM SERPL-SCNC: 140 MMOL/L (ref 135–145)

## 2024-02-12 PROCEDURE — 80048 BASIC METABOLIC PNL TOTAL CA: CPT | Mod: ORL | Performed by: FAMILY MEDICINE

## 2024-02-12 PROCEDURE — 85610 PROTHROMBIN TIME: CPT | Mod: ORL | Performed by: FAMILY MEDICINE

## 2024-02-12 PROCEDURE — P9604 ONE-WAY ALLOW PRORATED TRIP: HCPCS | Mod: ORL | Performed by: FAMILY MEDICINE

## 2024-02-12 PROCEDURE — 85018 HEMOGLOBIN: CPT | Mod: ORL | Performed by: FAMILY MEDICINE

## 2024-02-12 PROCEDURE — 36415 COLL VENOUS BLD VENIPUNCTURE: CPT | Mod: ORL | Performed by: FAMILY MEDICINE

## 2024-02-12 NOTE — TELEPHONE ENCOUNTER
[facility] Geriatrics Triage Nurse INR     Provider: Jessica Cooper MD  Facility: [facility]   Facility Type:  TCU    Caller: Katina  Call Back Number: [phone]  Reason for call: INR  Diagnosis/Goal: A. Fib    Todays INR: 2.46  Last INR 2/8 1.64(6mg daily), 2/7 1.4(5mg). Home dose: 7.5mg Tues and Sat and 5mg AOD     Heparin/Lovenox:  No  Currently on ABX?: No  Other interacting medication:  ASA  Missed or refused doses: No        Nurse is also reporting Hgb and BMP results.        Verbal Order/Direction given by Provider: Continue Warfarin 6mg daily.  Next INR 2/19/24.      Provider Giving Order:  Jessica Cooper MD    Verbal Order given to: Katina Damon RN

## 2024-02-17 ENCOUNTER — LAB REQUISITION (OUTPATIENT)
Dept: LAB | Facility: CLINIC | Age: 89
End: 2024-02-17
Payer: COMMERCIAL

## 2024-02-17 DIAGNOSIS — Z79.01 LONG TERM (CURRENT) USE OF ANTICOAGULANTS: ICD-10-CM

## 2024-02-19 ENCOUNTER — TELEPHONE (OUTPATIENT)
Dept: GERIATRICS | Facility: CLINIC | Age: 89
End: 2024-02-19

## 2024-02-19 LAB — INR PPP: 4.43 (ref 0.85–1.15)

## 2024-02-19 PROCEDURE — 85610 PROTHROMBIN TIME: CPT | Mod: ORL | Performed by: FAMILY MEDICINE

## 2024-02-19 PROCEDURE — 36415 COLL VENOUS BLD VENIPUNCTURE: CPT | Mod: ORL | Performed by: FAMILY MEDICINE

## 2024-02-19 PROCEDURE — P9604 ONE-WAY ALLOW PRORATED TRIP: HCPCS | Mod: ORL | Performed by: FAMILY MEDICINE

## 2024-02-19 NOTE — TELEPHONE ENCOUNTER
[facility] Geriatrics Triage Nurse INR     Provider: Jessica Cooper MD  Facility: [facility]   Facility Type:  TCU    Caller: Maria Fernandao  Call Back Number: [phone]  Reason for call: INR  Diagnosis/Goal: A. Fib    Todays INR: 4.43  Last INR 2/12 2.46(6mg daily), 2/8 1.64(6mg daily), 2/7 1.4(5mg). Home dose: 7.5mg Tues and Sat and 5mg AOD      Heparin/Lovenox:  No  Currently on ABX?: No  Other interacting medication:  ASA  Missed or refused doses: No    Verbal Order/Direction given by Provider: Hold Warfarin x 2 days.  Next INR 2/21/24.      Provider Giving Order:  Jessica Cooper MD    Verbal Order given to: Marley Damon RN

## 2024-02-20 ENCOUNTER — LAB REQUISITION (OUTPATIENT)
Dept: LAB | Facility: CLINIC | Age: 89
End: 2024-02-20
Payer: COMMERCIAL

## 2024-02-20 ENCOUNTER — TRANSITIONAL CARE UNIT VISIT (OUTPATIENT)
Dept: GERIATRICS | Facility: CLINIC | Age: 89
End: 2024-02-20
Payer: COMMERCIAL

## 2024-02-20 VITALS
RESPIRATION RATE: 16 BRPM | SYSTOLIC BLOOD PRESSURE: 98 MMHG | HEIGHT: 60 IN | TEMPERATURE: 97.8 F | DIASTOLIC BLOOD PRESSURE: 60 MMHG | HEART RATE: 84 BPM | WEIGHT: 97 LBS | OXYGEN SATURATION: 98 % | BODY MASS INDEX: 19.04 KG/M2

## 2024-02-20 DIAGNOSIS — Z98.890 STATUS POST ENDOVASCULAR ANEURYSM REPAIR (EVAR): ICD-10-CM

## 2024-02-20 DIAGNOSIS — I48.91 UNSPECIFIED ATRIAL FIBRILLATION (H): ICD-10-CM

## 2024-02-20 DIAGNOSIS — I48.0 PAROXYSMAL ATRIAL FIBRILLATION (H): Primary | ICD-10-CM

## 2024-02-20 DIAGNOSIS — Z86.79 STATUS POST ENDOVASCULAR ANEURYSM REPAIR (EVAR): ICD-10-CM

## 2024-02-20 DIAGNOSIS — Z79.01 ANTICOAGULATION MONITORING, INR RANGE 2-3: ICD-10-CM

## 2024-02-20 DIAGNOSIS — I10 ESSENTIAL HYPERTENSION: ICD-10-CM

## 2024-02-20 DIAGNOSIS — K55.9 ISCHEMIC COLITIS (H): ICD-10-CM

## 2024-02-20 DIAGNOSIS — Z90.49 HISTORY OF PARTIAL COLECTOMY: ICD-10-CM

## 2024-02-20 DIAGNOSIS — D62 ABLA (ACUTE BLOOD LOSS ANEMIA): ICD-10-CM

## 2024-02-20 PROCEDURE — 99309 SBSQ NF CARE MODERATE MDM 30: CPT | Performed by: FAMILY MEDICINE

## 2024-02-20 NOTE — LETTER
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 intake;supplements given due to poor intake  Patient and daughter-in-law present at bedside requesting colostomy education    Generalized anasarca/CHF-monitor wt  Weights have been stable since admission    Bilateral pleural effusion status post thoracentesis  No respiratory distress reported    A-fib with rapid ventricular response  On warfarin monitor INRs-supratherapeutic on last check at 4.4 so Coumadin has been held will recheck INR again tomorrow    Acute respiratory failure postoperatively currently resolved status post bronchoscopy on 1/15/2024 and 1/20/2024    ABLA recheck hemoglobin done in the TCU stable at 10.3    Aortic stenosis status post AVR on anticoagulation    Hypertension was on HCTZ and lisinopril  Meds were held in the hospital due to low blood pressures noted  Monitor BP-not on any meds and stable so far    GERD continue PPI    Hyperlipidemia continue with her statins    Acute delirium/suspected cognitive impairment   Patient is much more alert oriented and cognitively improved will continue to monitor    PCM-ON supplements  Generalized weakness-cont  PT  Doing better but remains frail and weak  Will also change trazodone to as needed due to improvement in cognitive status        History     Patient is a very pleasant 88 year old female who is admitted to TCU  Patient was electively admitted with history of infrarenal AAA and enlarging saccular aneurysm who underwent elective aortic endovascular repair on 1/11/2024  Postoperative course complicated by A-fib with rapid ventricular response requiring initiation of amiodarone  In addition she had worsening abdominal pain and required I&D for evaluation of hemorrhage from proximal aspect of her repair on 1/12/2024  She required repeat OR intervention on 1/14/2024 with abdominal washout and fascial closure with removal of wound VAC  Subsequently she was noted to have ischemic changes in her sigmoid colon and underwent sigmoid colectomy with descending end colostomy  This her symptoms improved and family is requesting colostomy education for her patient is in agreement  She had bronchoscopy on 1/15/2024 and eventually was extubated  Noted to have anasarca with bilateral pleural effusion and underwent thoracentesis  Since then weights have been stable  Patient is confused and a poor historian and family has elected DNR CODE STATUS  Cognitive status has improved since then patient is less confused now    Past Medical & Surgical History     Htn  Gerd  A fib      Past Social History     Reviewed,      Family History     Reviewed, and family history is not on file.    Medication List     Current Outpatient Medications   Medication     acetaminophen (TYLENOL) 325 MG tablet     aspirin 81 MG EC tablet     Calcium Carb-Cholecalciferol (CALCIUM 500 + D) 500-3.125 MG-MCG TABS     famotidine (PEPCID) 40 MG tablet     hydrOXYzine HCl (ATARAX) 25 MG tablet     melatonin 5 MG tablet     nitroGLYcerin (NITROSTAT) 0.4 MG sublingual tablet     potassium chloride (KLOR-CON) 20 MEQ packet     senna-docusate (SENOKOT-S/PERICOLACE) 8.6-50  MG tablet     simvastatin (ZOCOR) 10 MG tablet     traZODone (DESYREL) 50 MG tablet     WARFARIN SODIUM PO     No current facility-administered medications for this visit.      MED REC REQUIRED  Post Medication Reconciliation Status:          Allergies     No Known Allergies    Review of Systems   A comprehensive review of 14 systems was done. Pertinent findings noted here and in history of present illness. All the rest negative.  Constitutional: Negative.  Negative for fever, chills, she has  activity change, appetite change and fatigue.   Eating poorly  HENT: Negative for congestion and facial swelling.    Eyes: Negative for photophobia, redness and visual disturbance.   Respiratory: Negative for cough and chest tightness.    Cardiovascular: Negative for chest pain, palpitations and leg swelling.   Gastrointestinal: Negative for nausea, diarrhea, constipation, blood in stool and abdominal distention.   Has a colostomy which is functional  Genitourinary: Negative.    Musculoskeletal: Negative.  Using a walker  Skin: Negative.    Neurological: Negative for dizziness, tremors, syncope, weakness, light-headedness and headaches.   Hematological: Does not bruise/bleed easily.   Psychiatric/Behavioral: Negative.  Limited recall      Physical Exam   BP 98/60   Pulse 84   Temp 97.8  F (36.6  C)   Resp 16   Ht 1.524 m (5')   Wt 44 kg (97 lb)   SpO2 98%   BMI 18.94 kg/m       Constitutional: Oriented to person, place,  and appears well-developed.   Frail and weak  HEENT:  Normocephalic and atraumatic.  Eyes: Conjunctivae and EOM are normal. Pupils are equal, round, and reactive to light. No discharge.  No scleral icterus. Nose normal. Mouth/Throat: Oropharynx is clear and moist. No oropharyngeal exudate.    NECK: Normal range of motion. Neck supple. No JVD present. No tracheal deviation present. No thyromegaly present.   CARDIOVASCULAR: Normal rate, regular rhythm and intact distal pulses.  Exam reveals no gallop and  no friction rub.  Systolic murmur present.  PULMONARY: Effort normal and breath sounds normal. No respiratory distress.No Wheezing or rales.  ABDOMEN: Soft. Bowel sounds are normal. No distension and no mass.  There is no tenderness. There is no rebound and no guarding. No HSM.  Midline incision noted   Colostomy noted  MUSCULOSKELETAL: Normal range of motion. Mild kyphosis, no tenderness.  LYMPH NODES: Has no cervical, supraclavicular, axillary and groin adenopathy.   NEUROLOGICAL: Alert and oriented to person, place, . No cranial nerve deficit.  Normal muscle tone. Coordination normal.   GENITOURINARY: Deferred exam.  SKIN: Skin is warm and dry. No rash noted. No erythema. No pallor.   EXTREMITIES: No cyanosis, no clubbing, no edema. No Deformity.  PSYCHIATRIC: Normal mood, affect and behavior.  Limited recall      Lab Results         Recent Results (from the past 240 hour(s))   Basic metabolic panel    Collection Time: 02/12/24  8:31 AM   Result Value Ref Range    Sodium 140 135 - 145 mmol/L    Potassium 3.9 3.4 - 5.3 mmol/L    Chloride 107 98 - 107 mmol/L    Carbon Dioxide (CO2) 20 (L) 22 - 29 mmol/L    Anion Gap 13 7 - 15 mmol/L    Urea Nitrogen 13.4 8.0 - 23.0 mg/dL    Creatinine 0.93 0.51 - 0.95 mg/dL    GFR Estimate 60 (L) >60 mL/min/1.73m2    Calcium 9.1 8.8 - 10.2 mg/dL    Glucose 114 (H) 70 - 99 mg/dL   Hemoglobin    Collection Time: 02/12/24  8:31 AM   Result Value Ref Range    Hemoglobin 10.3 (L) 11.7 - 15.7 g/dL   INR    Collection Time: 02/12/24  8:31 AM   Result Value Ref Range    INR 2.46 (H) 0.85 - 1.15   INR    Collection Time: 02/19/24 10:33 AM   Result Value Ref Range    INR 4.43 (H) 0.85 - 1.15          Electronically signed by    Jessica Cooper MD                            Sincerely,        ALTA Silva

## 2024-02-20 NOTE — PROGRESS NOTES
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 intake;supplements given due to poor intake  Patient and daughter-in-law present at bedside requesting colostomy education    Generalized anasarca/CHF-monitor wt  Weights have been stable since admission    Bilateral pleural effusion status post thoracentesis  No respiratory distress reported    A-fib with rapid ventricular response  On warfarin monitor INRs-supratherapeutic on last check at 4.4 so Coumadin has been held will recheck INR again tomorrow    Acute respiratory failure postoperatively currently resolved status post bronchoscopy on 1/15/2024 and 1/20/2024    ABLA recheck hemoglobin done in the TCU stable at 10.3    Aortic stenosis status post AVR on anticoagulation    Hypertension was on HCTZ and lisinopril  Meds were held in the hospital due to low blood pressures noted  Monitor BP-not on any meds and stable so far    GERD continue PPI    Hyperlipidemia continue with her statins    Acute delirium/suspected cognitive impairment   Patient is much more alert oriented and cognitively improved will continue to monitor    PCM-ON supplements  Generalized weakness-cont PT  Doing better but remains frail and weak  Will also change trazodone to as needed due to improvement in  cognitive status        History     Patient is a very pleasant 88 year old female who is admitted to TCU  Patient was electively admitted with history of infrarenal AAA and enlarging saccular aneurysm who underwent elective aortic endovascular repair on 1/11/2024  Postoperative course complicated by A-fib with rapid ventricular response requiring initiation of amiodarone  In addition she had worsening abdominal pain and required I&D for evaluation of hemorrhage from proximal aspect of her repair on 1/12/2024  She required repeat OR intervention on 1/14/2024 with abdominal washout and fascial closure with removal of wound VAC  Subsequently she was noted to have ischemic changes in her sigmoid colon and underwent sigmoid colectomy with descending end colostomy  This her symptoms improved and family is requesting colostomy education for her patient is in agreement  She had bronchoscopy on 1/15/2024 and eventually was extubated  Noted to have anasarca with bilateral pleural effusion and underwent thoracentesis  Since then weights have been stable  Patient is confused and a poor historian and family has elected DNR CODE STATUS  Cognitive status has improved since then patient is less confused now    Past Medical & Surgical History     Htn  Gerd  A fib      Past Social History     Reviewed,      Family History     Reviewed, and family history is not on file.    Medication List     Current Outpatient Medications   Medication    acetaminophen (TYLENOL) 325 MG tablet    aspirin 81 MG EC tablet    Calcium Carb-Cholecalciferol (CALCIUM 500 + D) 500-3.125 MG-MCG TABS    famotidine (PEPCID) 40 MG tablet    hydrOXYzine HCl (ATARAX) 25 MG tablet    melatonin 5 MG tablet    nitroGLYcerin (NITROSTAT) 0.4 MG sublingual tablet    potassium chloride (KLOR-CON) 20 MEQ packet    senna-docusate (SENOKOT-S/PERICOLACE) 8.6-50 MG tablet    simvastatin (ZOCOR) 10 MG tablet    traZODone (DESYREL) 50 MG tablet    WARFARIN SODIUM PO     No  current facility-administered medications for this visit.      MED REC REQUIRED  Post Medication Reconciliation Status:          Allergies     No Known Allergies    Review of Systems   A comprehensive review of 14 systems was done. Pertinent findings noted here and in history of present illness. All the rest negative.  Constitutional: Negative.  Negative for fever, chills, she has  activity change, appetite change and fatigue.   Eating poorly  HENT: Negative for congestion and facial swelling.    Eyes: Negative for photophobia, redness and visual disturbance.   Respiratory: Negative for cough and chest tightness.    Cardiovascular: Negative for chest pain, palpitations and leg swelling.   Gastrointestinal: Negative for nausea, diarrhea, constipation, blood in stool and abdominal distention.   Has a colostomy which is functional  Genitourinary: Negative.    Musculoskeletal: Negative.  Using a walker  Skin: Negative.    Neurological: Negative for dizziness, tremors, syncope, weakness, light-headedness and headaches.   Hematological: Does not bruise/bleed easily.   Psychiatric/Behavioral: Negative.  Limited recall      Physical Exam   BP 98/60   Pulse 84   Temp 97.8  F (36.6  C)   Resp 16   Ht 1.524 m (5')   Wt 44 kg (97 lb)   SpO2 98%   BMI 18.94 kg/m       Constitutional: Oriented to person, place,  and appears well-developed.   Frail and weak  HEENT:  Normocephalic and atraumatic.  Eyes: Conjunctivae and EOM are normal. Pupils are equal, round, and reactive to light. No discharge.  No scleral icterus. Nose normal. Mouth/Throat: Oropharynx is clear and moist. No oropharyngeal exudate.    NECK: Normal range of motion. Neck supple. No JVD present. No tracheal deviation present. No thyromegaly present.   CARDIOVASCULAR: Normal rate, regular rhythm and intact distal pulses.  Exam reveals no gallop and no friction rub.  Systolic murmur present.  PULMONARY: Effort normal and breath sounds normal. No respiratory  distress.No Wheezing or rales.  ABDOMEN: Soft. Bowel sounds are normal. No distension and no mass.  There is no tenderness. There is no rebound and no guarding. No HSM.  Midline incision noted   Colostomy noted  MUSCULOSKELETAL: Normal range of motion. Mild kyphosis, no tenderness.  LYMPH NODES: Has no cervical, supraclavicular, axillary and groin adenopathy.   NEUROLOGICAL: Alert and oriented to person, place, . No cranial nerve deficit.  Normal muscle tone. Coordination normal.   GENITOURINARY: Deferred exam.  SKIN: Skin is warm and dry. No rash noted. No erythema. No pallor.   EXTREMITIES: No cyanosis, no clubbing, no edema. No Deformity.  PSYCHIATRIC: Normal mood, affect and behavior.  Limited recall      Lab Results         Recent Results (from the past 240 hour(s))   Basic metabolic panel    Collection Time: 02/12/24  8:31 AM   Result Value Ref Range    Sodium 140 135 - 145 mmol/L    Potassium 3.9 3.4 - 5.3 mmol/L    Chloride 107 98 - 107 mmol/L    Carbon Dioxide (CO2) 20 (L) 22 - 29 mmol/L    Anion Gap 13 7 - 15 mmol/L    Urea Nitrogen 13.4 8.0 - 23.0 mg/dL    Creatinine 0.93 0.51 - 0.95 mg/dL    GFR Estimate 60 (L) >60 mL/min/1.73m2    Calcium 9.1 8.8 - 10.2 mg/dL    Glucose 114 (H) 70 - 99 mg/dL   Hemoglobin    Collection Time: 02/12/24  8:31 AM   Result Value Ref Range    Hemoglobin 10.3 (L) 11.7 - 15.7 g/dL   INR    Collection Time: 02/12/24  8:31 AM   Result Value Ref Range    INR 2.46 (H) 0.85 - 1.15   INR    Collection Time: 02/19/24 10:33 AM   Result Value Ref Range    INR 4.43 (H) 0.85 - 1.15          Electronically signed by    Jessica Cooper MD

## 2024-02-21 ENCOUNTER — TELEPHONE (OUTPATIENT)
Dept: GERIATRICS | Facility: CLINIC | Age: 89
End: 2024-02-21

## 2024-02-21 LAB — INR PPP: 2.45 (ref 0.85–1.15)

## 2024-02-21 PROCEDURE — P9604 ONE-WAY ALLOW PRORATED TRIP: HCPCS | Mod: ORL | Performed by: FAMILY MEDICINE

## 2024-02-21 PROCEDURE — 36415 COLL VENOUS BLD VENIPUNCTURE: CPT | Mod: ORL | Performed by: FAMILY MEDICINE

## 2024-02-21 PROCEDURE — 85610 PROTHROMBIN TIME: CPT | Mod: ORL | Performed by: FAMILY MEDICINE

## 2024-02-21 NOTE — TELEPHONE ENCOUNTER
[facility] Geriatrics Triage Nurse INR     Provider: Jessica Cooper MD  Facility: [facility]   Facility Type:  TCU    Caller: Katie  Call Back Number: [phone]  Reason for call: INR  Diagnosis/Goal: A. Fib    Todays INR: 2.45  Last INR 2/19 4.43(hold x 2 days), 2/12 2.46(6mg daily), 2/8 1.64(6mg daily), 2/7 1.4(5mg). Home dose: 7.5mg Tues and Sat and 5mg AOD      Heparin/Lovenox:  No  Currently on ABX?: No  Other interacting medication:  ASA  Missed or refused doses: No    Verbal Order/Direction given by Provider: Warfarin 4.5mg daily.  Next INR 2/26/24.    Provider Giving Order:  Jessica Cooper MD    Verbal Order given to: Katie Damon RN

## 2024-02-25 ENCOUNTER — LAB REQUISITION (OUTPATIENT)
Dept: LAB | Facility: CLINIC | Age: 89
End: 2024-02-25
Payer: COMMERCIAL

## 2024-02-25 DIAGNOSIS — I48.0 PAROXYSMAL ATRIAL FIBRILLATION (H): ICD-10-CM

## 2024-02-26 ENCOUNTER — TELEPHONE (OUTPATIENT)
Dept: GERIATRICS | Facility: CLINIC | Age: 89
End: 2024-02-26

## 2024-02-26 LAB — INR PPP: 2.11 (ref 0.85–1.15)

## 2024-02-26 PROCEDURE — P9604 ONE-WAY ALLOW PRORATED TRIP: HCPCS | Mod: ORL | Performed by: FAMILY MEDICINE

## 2024-02-26 PROCEDURE — 36415 COLL VENOUS BLD VENIPUNCTURE: CPT | Mod: ORL | Performed by: FAMILY MEDICINE

## 2024-02-26 PROCEDURE — 85610 PROTHROMBIN TIME: CPT | Mod: ORL | Performed by: FAMILY MEDICINE

## 2024-02-26 NOTE — TELEPHONE ENCOUNTER
[facility] Geriatrics Triage Nurse INR     Provider: Jessica Cooper MD  Facility: [facility]   Facility Type:  TCU    Caller: Katie  Call Back Number: [phone]  Reason for call: INR  Diagnosis/Goal: A. Fib    Todays INR: 2.11  Last INR 2/21 2.45(4.5mg daily), 2/19 4.43(hold x 2 days), 2/12 2.46(6mg daily), 2/8 1.64(6mg daily), 2/7 1.4(5mg). Home dose: 7.5mg Tues and Sat and 5mg AOD       Heparin/Lovenox:  No  Currently on ABX?: No  Other interacting medication:  ASA  Missed or refused doses: No    Verbal Order/Direction given by Provider: Continue Warfarin 4.5mg daily.  Next INR 3/4/24.      Provider Giving Order:  Jessica Cooper MD    Verbal Order given to: Katie Damon RN

## 2024-02-27 ENCOUNTER — TRANSITIONAL CARE UNIT VISIT (OUTPATIENT)
Dept: GERIATRICS | Facility: CLINIC | Age: 89
End: 2024-02-27
Payer: COMMERCIAL

## 2024-02-27 VITALS
SYSTOLIC BLOOD PRESSURE: 89 MMHG | OXYGEN SATURATION: 95 % | DIASTOLIC BLOOD PRESSURE: 54 MMHG | BODY MASS INDEX: 18.06 KG/M2 | WEIGHT: 92 LBS | HEART RATE: 63 BPM | TEMPERATURE: 97.9 F | RESPIRATION RATE: 18 BRPM | HEIGHT: 60 IN

## 2024-02-27 DIAGNOSIS — K21.9 GASTROESOPHAGEAL REFLUX DISEASE, UNSPECIFIED WHETHER ESOPHAGITIS PRESENT: ICD-10-CM

## 2024-02-27 DIAGNOSIS — Z98.890 STATUS POST ENDOVASCULAR ANEURYSM REPAIR (EVAR): ICD-10-CM

## 2024-02-27 DIAGNOSIS — I10 ESSENTIAL HYPERTENSION: ICD-10-CM

## 2024-02-27 DIAGNOSIS — Z86.79 STATUS POST ENDOVASCULAR ANEURYSM REPAIR (EVAR): ICD-10-CM

## 2024-02-27 DIAGNOSIS — Z79.01 ANTICOAGULATION MONITORING, INR RANGE 2-3: ICD-10-CM

## 2024-02-27 DIAGNOSIS — I48.0 PAROXYSMAL ATRIAL FIBRILLATION (H): Primary | ICD-10-CM

## 2024-02-27 DIAGNOSIS — D62 ABLA (ACUTE BLOOD LOSS ANEMIA): ICD-10-CM

## 2024-02-27 DIAGNOSIS — Z90.49 HISTORY OF PARTIAL COLECTOMY: ICD-10-CM

## 2024-02-27 PROCEDURE — 99309 SBSQ NF CARE MODERATE MDM 30: CPT | Performed by: FAMILY MEDICINE

## 2024-02-27 NOTE — LETTER
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 intake  Staff has been encouraging patient to eat she is also on supplementation  Monitor weights  Education being given regarding self change of her colostomy bags    Chronic pain -scheduled tylenol 1 gm bid per pt request  Continue as needed Tylenol also    Chronic insomnia-discontinue trazodone  Also stop hydroxyzine    Generalized anasarca/CHF-monitor wt  Weights have been stable since admission    Bilateral pleural effusion status post thoracentesis  No respiratory distress reported    A-fib with rapid ventricular response  On warfarin monitor INRs-2.1 ON LAST CHECK    Acute respiratory failure postoperatively currently resolved   status post bronchoscopy on 1/15/2024 and 1/20/2024    ABLA recheck hemoglobin done in the TCU stable at 10.3    Aortic stenosis status post AVR on anticoagulation    Profound hypotension /hypertension was on HCTZ and lisinopril  Meds were held in the hospital due to low blood pressures noted  Monitor BP-not on any meds and stable so far  Push fluids due to very low blood pressures noted in the TCU today    GERD continue  PPI    Hyperlipidemia continue with her statins    Acute delirium/suspected cognitive impairment   Patient is much more alert oriented and cognitively improved will continue to monitor    PCM-ON supplements  Generalized weakness-cont PT  Doing better but remains frail and weak encourage use of walker        History     Patient is a very pleasant 88 year old female who is admitted to TCU  Patient was electively admitted with history of infrarenal AAA and enlarging saccular aneurysm who underwent elective aortic endovascular repair on 1/11/2024  Postoperative course complicated by A-fib with rapid ventricular response requiring initiation of amiodarone  In addition she had worsening abdominal pain and required I&D for evaluation of hemorrhage from proximal aspect of her repair on 1/12/2024  She required repeat OR intervention on 1/14/2024 with abdominal washout and fascial closure with removal of wound VAC  Subsequently she was noted to have ischemic changes in her sigmoid colon and underwent sigmoid colectomy with descending end colostomy  He denies any pain concerns other than chronic pain and is requesting Tylenol  She had bronchoscopy on 1/15/2024 and eventually was extubated  Noted to have anasarca with bilateral pleural effusion and underwent thoracentesis  Since then weights have been stable  She has not been eating much either and no longer needs oxygen  Patient is confused and a poor historian and family has elected DNR CODE STATUS  Cognitive status has improved since then patient is less confused now  Blood pressures noted to be very low today    Past Medical & Surgical History     Htn  Gerd  A fib      Past Social History     Reviewed,      Family History     Reviewed, and family history is not on file.    Medication List     Current Outpatient Medications   Medication     acetaminophen (TYLENOL) 325 MG tablet     aspirin 81 MG EC tablet     Calcium Carb-Cholecalciferol (CALCIUM 500 + D) 500-3.125 MG-MCG  TABS     famotidine (PEPCID) 40 MG tablet     melatonin 5 MG tablet     nitroGLYcerin (NITROSTAT) 0.4 MG sublingual tablet     potassium chloride (KLOR-CON) 20 MEQ packet     senna-docusate (SENOKOT-S/PERICOLACE) 8.6-50 MG tablet     simvastatin (ZOCOR) 10 MG tablet     WARFARIN SODIUM PO     No current facility-administered medications for this visit.      MED REC REQUIRED  Post Medication Reconciliation Status:          Allergies     No Known Allergies    Review of Systems   A comprehensive review of 14 systems was done. Pertinent findings noted here and in history of present illness. All the rest negative.  Constitutional: Negative.  Negative for fever, chills, she has  activity change, appetite change and fatigue.   Eating poorly; blood pressures are frequently very low  HENT: Negative for congestion and facial swelling.    Eyes: Negative for photophobia, redness and visual disturbance.   Respiratory: Negative for cough and chest tightness.    Cardiovascular: Negative for chest pain, palpitations and leg swelling.   Gastrointestinal: Negative for nausea, diarrhea, constipation, blood in stool and abdominal distention.   Has a colostomy which is functional  Genitourinary: Negative.    Musculoskeletal: Negative.  Using a walker  Skin: Negative.    Neurological: Negative for dizziness, tremors, syncope, weakness, light-headedness and headaches.   Hematological: Does not bruise/bleed easily.   Psychiatric/Behavioral: Negative.  Limited recall      Physical Exam   BP (!) 89/54   Pulse 63   Temp 97.9  F (36.6  C)   Resp 18   Ht 1.524 m (5')   Wt 41.7 kg (92 lb)   SpO2 95%   BMI 17.97 kg/m       Constitutional: Oriented to person, place,  and appears well-developed.   Frail and weak  HEENT:  Normocephalic and atraumatic.  Eyes: Conjunctivae and EOM are normal. Pupils are equal, round, and reactive to light. No discharge.  No scleral icterus. Nose normal. Mouth/Throat: Oropharynx is clear and moist. No  oropharyngeal exudate.    NECK: Normal range of motion. Neck supple. No JVD present. No tracheal deviation present. No thyromegaly present.   CARDIOVASCULAR: Normal rate, regular rhythm and intact distal pulses.  Exam reveals no gallop and no friction rub.  Systolic murmur present.  PULMONARY: Effort normal and breath sounds normal. No respiratory distress.No Wheezing or rales.  ABDOMEN: Soft. Bowel sounds are normal. No distension and no mass.  There is no tenderness. There is no rebound and no guarding. No HSM.  Midline incision noted   Colostomy noted  MUSCULOSKELETAL: Normal range of motion. Mild kyphosis, no tenderness.  LYMPH NODES: Has no cervical, supraclavicular, axillary and groin adenopathy.   NEUROLOGICAL: Alert and oriented to person, place, . No cranial nerve deficit.  Normal muscle tone. Coordination normal.   GENITOURINARY: Deferred exam.  SKIN: Skin is warm and dry. No rash noted. No erythema. No pallor.   EXTREMITIES: No cyanosis, no clubbing, no edema. No Deformity.  PSYCHIATRIC: Normal mood, affect and behavior.  Limited recall      Lab Results         Recent Results (from the past 240 hour(s))   INR    Collection Time: 02/19/24 10:33 AM   Result Value Ref Range    INR 4.43 (H) 0.85 - 1.15   INR    Collection Time: 02/21/24  6:30 AM   Result Value Ref Range    INR 2.45 (H) 0.85 - 1.15   INR    Collection Time: 02/26/24  7:45 AM   Result Value Ref Range    INR 2.11 (H) 0.85 - 1.15          Electronically signed by    Jessica Cooper MD                            Sincerely,        ALTA Silva

## 2024-02-27 NOTE — PROGRESS NOTES
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 intake  Staff has been encouraging patient to eat she is also on supplementation  Monitor weights  Education being given regarding self change of her colostomy bags    Chronic pain -scheduled tylenol 1 gm bid per pt request  Continue as needed Tylenol also    Chronic insomnia-discontinue trazodone  Also stop hydroxyzine    Generalized anasarca/CHF-monitor wt  Weights have been stable since admission    Bilateral pleural effusion status post thoracentesis  No respiratory distress reported    A-fib with rapid ventricular response  On warfarin monitor INRs-2.1 ON LAST CHECK    Acute respiratory failure postoperatively currently resolved   status post bronchoscopy on 1/15/2024 and 1/20/2024    ABLA recheck hemoglobin done in the TCU stable at 10.3    Aortic stenosis status post AVR on anticoagulation    Profound hypotension /hypertension was on HCTZ and lisinopril  Meds were held in the hospital due to low blood pressures noted  Monitor BP-not on any meds and stable so far  Push fluids due to very low blood pressures noted in the TCU today    GERD continue PPI    Hyperlipidemia continue with her statins    Acute delirium/suspected cognitive impairment   Patient  is much more alert oriented and cognitively improved will continue to monitor    PCM-ON supplements  Generalized weakness-cont PT  Doing better but remains frail and weak encourage use of walker        History     Patient is a very pleasant 88 year old female who is admitted to TCU  Patient was electively admitted with history of infrarenal AAA and enlarging saccular aneurysm who underwent elective aortic endovascular repair on 1/11/2024  Postoperative course complicated by A-fib with rapid ventricular response requiring initiation of amiodarone  In addition she had worsening abdominal pain and required I&D for evaluation of hemorrhage from proximal aspect of her repair on 1/12/2024  She required repeat OR intervention on 1/14/2024 with abdominal washout and fascial closure with removal of wound VAC  Subsequently she was noted to have ischemic changes in her sigmoid colon and underwent sigmoid colectomy with descending end colostomy  He denies any pain concerns other than chronic pain and is requesting Tylenol  She had bronchoscopy on 1/15/2024 and eventually was extubated  Noted to have anasarca with bilateral pleural effusion and underwent thoracentesis  Since then weights have been stable  She has not been eating much either and no longer needs oxygen  Patient is confused and a poor historian and family has elected DNR CODE STATUS  Cognitive status has improved since then patient is less confused now  Blood pressures noted to be very low today    Past Medical & Surgical History     Htn  Gerd  A fib      Past Social History     Reviewed,      Family History     Reviewed, and family history is not on file.    Medication List     Current Outpatient Medications   Medication    acetaminophen (TYLENOL) 325 MG tablet    aspirin 81 MG EC tablet    Calcium Carb-Cholecalciferol (CALCIUM 500 + D) 500-3.125 MG-MCG TABS    famotidine (PEPCID) 40 MG tablet    melatonin 5 MG tablet    nitroGLYcerin (NITROSTAT) 0.4 MG sublingual  tablet    potassium chloride (KLOR-CON) 20 MEQ packet    senna-docusate (SENOKOT-S/PERICOLACE) 8.6-50 MG tablet    simvastatin (ZOCOR) 10 MG tablet    WARFARIN SODIUM PO     No current facility-administered medications for this visit.      MED REC REQUIRED  Post Medication Reconciliation Status:          Allergies     No Known Allergies    Review of Systems   A comprehensive review of 14 systems was done. Pertinent findings noted here and in history of present illness. All the rest negative.  Constitutional: Negative.  Negative for fever, chills, she has  activity change, appetite change and fatigue.   Eating poorly; blood pressures are frequently very low  HENT: Negative for congestion and facial swelling.    Eyes: Negative for photophobia, redness and visual disturbance.   Respiratory: Negative for cough and chest tightness.    Cardiovascular: Negative for chest pain, palpitations and leg swelling.   Gastrointestinal: Negative for nausea, diarrhea, constipation, blood in stool and abdominal distention.   Has a colostomy which is functional  Genitourinary: Negative.    Musculoskeletal: Negative.  Using a walker  Skin: Negative.    Neurological: Negative for dizziness, tremors, syncope, weakness, light-headedness and headaches.   Hematological: Does not bruise/bleed easily.   Psychiatric/Behavioral: Negative.  Limited recall      Physical Exam   BP (!) 89/54   Pulse 63   Temp 97.9  F (36.6  C)   Resp 18   Ht 1.524 m (5')   Wt 41.7 kg (92 lb)   SpO2 95%   BMI 17.97 kg/m       Constitutional: Oriented to person, place,  and appears well-developed.   Frail and weak  HEENT:  Normocephalic and atraumatic.  Eyes: Conjunctivae and EOM are normal. Pupils are equal, round, and reactive to light. No discharge.  No scleral icterus. Nose normal. Mouth/Throat: Oropharynx is clear and moist. No oropharyngeal exudate.    NECK: Normal range of motion. Neck supple. No JVD present. No tracheal deviation present. No thyromegaly  present.   CARDIOVASCULAR: Normal rate, regular rhythm and intact distal pulses.  Exam reveals no gallop and no friction rub.  Systolic murmur present.  PULMONARY: Effort normal and breath sounds normal. No respiratory distress.No Wheezing or rales.  ABDOMEN: Soft. Bowel sounds are normal. No distension and no mass.  There is no tenderness. There is no rebound and no guarding. No HSM.  Midline incision noted   Colostomy noted  MUSCULOSKELETAL: Normal range of motion. Mild kyphosis, no tenderness.  LYMPH NODES: Has no cervical, supraclavicular, axillary and groin adenopathy.   NEUROLOGICAL: Alert and oriented to person, place, . No cranial nerve deficit.  Normal muscle tone. Coordination normal.   GENITOURINARY: Deferred exam.  SKIN: Skin is warm and dry. No rash noted. No erythema. No pallor.   EXTREMITIES: No cyanosis, no clubbing, no edema. No Deformity.  PSYCHIATRIC: Normal mood, affect and behavior.  Limited recall      Lab Results         Recent Results (from the past 240 hour(s))   INR    Collection Time: 02/19/24 10:33 AM   Result Value Ref Range    INR 4.43 (H) 0.85 - 1.15   INR    Collection Time: 02/21/24  6:30 AM   Result Value Ref Range    INR 2.45 (H) 0.85 - 1.15   INR    Collection Time: 02/26/24  7:45 AM   Result Value Ref Range    INR 2.11 (H) 0.85 - 1.15          Electronically signed by    Jessica Cooper MD

## 2024-03-01 ENCOUNTER — LAB REQUISITION (OUTPATIENT)
Dept: LAB | Facility: CLINIC | Age: 89
End: 2024-03-01
Payer: COMMERCIAL

## 2024-03-01 DIAGNOSIS — I48.0 PAROXYSMAL ATRIAL FIBRILLATION (H): ICD-10-CM

## 2024-03-04 ENCOUNTER — DISCHARGE SUMMARY NURSING HOME (OUTPATIENT)
Dept: GERIATRICS | Facility: CLINIC | Age: 89
End: 2024-03-04
Payer: COMMERCIAL

## 2024-03-04 ENCOUNTER — TELEPHONE (OUTPATIENT)
Dept: GERIATRICS | Facility: CLINIC | Age: 89
End: 2024-03-04

## 2024-03-04 VITALS
HEIGHT: 60 IN | HEART RATE: 100 BPM | TEMPERATURE: 98.4 F | WEIGHT: 93 LBS | OXYGEN SATURATION: 99 % | BODY MASS INDEX: 18.26 KG/M2 | SYSTOLIC BLOOD PRESSURE: 110 MMHG | RESPIRATION RATE: 18 BRPM | DIASTOLIC BLOOD PRESSURE: 70 MMHG

## 2024-03-04 DIAGNOSIS — I48.0 PAROXYSMAL ATRIAL FIBRILLATION (H): Primary | ICD-10-CM

## 2024-03-04 DIAGNOSIS — Z98.890 STATUS POST ENDOVASCULAR ANEURYSM REPAIR (EVAR): ICD-10-CM

## 2024-03-04 DIAGNOSIS — D62 ABLA (ACUTE BLOOD LOSS ANEMIA): ICD-10-CM

## 2024-03-04 DIAGNOSIS — K21.9 GASTROESOPHAGEAL REFLUX DISEASE, UNSPECIFIED WHETHER ESOPHAGITIS PRESENT: ICD-10-CM

## 2024-03-04 DIAGNOSIS — Z90.49 HISTORY OF PARTIAL COLECTOMY: ICD-10-CM

## 2024-03-04 DIAGNOSIS — I10 ESSENTIAL HYPERTENSION: ICD-10-CM

## 2024-03-04 DIAGNOSIS — Z86.79 STATUS POST ENDOVASCULAR ANEURYSM REPAIR (EVAR): ICD-10-CM

## 2024-03-04 DIAGNOSIS — K55.9 ISCHEMIC COLITIS (H): ICD-10-CM

## 2024-03-04 LAB — INR PPP: 3.18 (ref 0.85–1.15)

## 2024-03-04 PROCEDURE — 36415 COLL VENOUS BLD VENIPUNCTURE: CPT | Mod: ORL | Performed by: FAMILY MEDICINE

## 2024-03-04 PROCEDURE — P9604 ONE-WAY ALLOW PRORATED TRIP: HCPCS | Mod: ORL | Performed by: FAMILY MEDICINE

## 2024-03-04 PROCEDURE — 99315 NF DSCHRG MGMT 30 MIN/LESS: CPT | Performed by: FAMILY MEDICINE

## 2024-03-04 PROCEDURE — 85610 PROTHROMBIN TIME: CPT | Mod: ORL | Performed by: FAMILY MEDICINE

## 2024-03-04 RX ORDER — ACETAMINOPHEN 325 MG/1
650 TABLET ORAL EVERY 4 HOURS PRN
COMMUNITY

## 2024-03-04 NOTE — LETTER
3/4/2024        RE: Modesta Tejeda  [address]  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 intake  Staff has been encouraging patient to eat she is also on supplementation  Monitor weights  Education being given regarding self change of her colostomy bags    Chronic pain -now on scheduled tylenol 1 gm bid per pt request  Continue as needed Tylenol also    Chronic insomnia-discontinue trazodone  Also stop hydroxyzine  Sleeping well    Generalized anasarca/CHF-monitor wt  Weights have been stable since admission    Bilateral pleural effusion status post thoracentesis  No respiratory distress reported    A-fib with rapid ventricular response  On warfarin monitor INRs-3.1  Hold coumadin tonight   Start on lower dose tomorrow-r/c in 4d    Acute respiratory failure postoperatively currently resolved   status post bronchoscopy on 1/15/2024 and 1/20/2024    ABLA recheck hemoglobin done in the TCU stable at 10.3    Aortic stenosis status post AVR on anticoagulation    Profound hypotension /hypertension was on HCTZ and lisinopril  Meds were held in the hospital due to low blood pressures noted  Monitor BP-not on any meds   Has  frequent low Bps noted    GERD continue PPI    Hyperlipidemia continue with her statins    Acute delirium/suspected cognitive impairment   Patient is much more alert oriented and cognitively improved will continue to monitor    PCM-ON supplements  Generalized weakness-cont PT  Doing better but remains frail and weak encourage use of walker  Independent in her room        History     Patient is a very pleasant 88 year old female who was admitted to TCU  Patient was electively admitted with history of infrarenal AAA and enlarging saccular aneurysm who underwent elective aortic endovascular repair on 1/11/2024  Postoperative course complicated by A-fib with rapid ventricular response requiring initiation of amiodarone  In addition she had worsening abdominal pain and required I&D for evaluation of hemorrhage from proximal aspect of her repair on 1/12/2024  She required repeat OR intervention on 1/14/2024 with abdominal washout and fascial closure with removal of wound VAC  Subsequently she was noted to have ischemic changes in her sigmoid colon and underwent sigmoid colectomy with descending end colostomy  sHe denies any pain concerns other than chronic pain and is on Tylenol  She had bronchoscopy on 1/15/2024 and eventually was extubated  Noted to have anasarca with bilateral pleural effusion and underwent thoracentesis  Since then weights have been stable  She has not been eating much either and no longer needs oxygen  Patient is confused and a poor historian and family has elected DNR CODE STATUS  Cognitive status has improved since then patient is less confused now  Blood pressures noted to be very and she is no longer on any meds    Past Medical & Surgical History     Htn  Gerd  A fib      Past Social History     Reviewed,      Family History     Reviewed, and family history is not on file.    Medication List     Current Outpatient Medications   Medication     acetaminophen (TYLENOL) 325 MG tablet      acetaminophen (TYLENOL) 325 MG tablet     aspirin 81 MG EC tablet     Calcium Carb-Cholecalciferol (CALCIUM 500 + D) 500-3.125 MG-MCG TABS     famotidine (PEPCID) 40 MG tablet     melatonin 5 MG tablet     nitroGLYcerin (NITROSTAT) 0.4 MG sublingual tablet     potassium chloride (KLOR-CON) 20 MEQ packet     senna-docusate (SENOKOT-S/PERICOLACE) 8.6-50 MG tablet     simvastatin (ZOCOR) 10 MG tablet     WARFARIN SODIUM PO     No current facility-administered medications for this visit.      MED REC REQUIRED  Post Medication Reconciliation Status:          Allergies     No Known Allergies    Review of Systems   A comprehensive review of 14 systems was done. Pertinent findings noted here and in history of present illness. All the rest negative.  Constitutional: Negative.  Negative for fever, chills, she has  activity change, appetite change and fatigue.   Eating poorly; blood pressures are frequently very low  HENT: Negative for congestion and facial swelling.    Eyes: Negative for photophobia, redness and visual disturbance.   Respiratory: Negative for cough and chest tightness.    Cardiovascular: Negative for chest pain, palpitations and leg swelling.   Gastrointestinal: Negative for nausea, diarrhea, constipation, blood in stool and abdominal distention.   Has a colostomy which is functional  Genitourinary: Negative.    Musculoskeletal: Negative.  Using a walker  Skin: Negative.    Neurological: Negative for dizziness, tremors, syncope, weakness, light-headedness and headaches.   Hematological: Does not bruise/bleed easily.   Psychiatric/Behavioral: Negative.  Limited recall      Physical Exam   /70   Pulse 100   Temp 98.4  F (36.9  C)   Resp 18   Ht 1.524 m (5')   Wt 42.2 kg (93 lb)   SpO2 99%   BMI 18.16 kg/m       Constitutional: Oriented to person, place,  and appears well-developed.   Frail and weak  HEENT:  Normocephalic and atraumatic.  Eyes: Conjunctivae and EOM are normal. Pupils are equal,  round, and reactive to light. No discharge.  No scleral icterus. Nose normal. Mouth/Throat: Oropharynx is clear and moist. No oropharyngeal exudate.    NECK: Normal range of motion. Neck supple. No JVD present. No tracheal deviation present. No thyromegaly present.   CARDIOVASCULAR: Normal rate, regular rhythm and intact distal pulses.  Exam reveals no gallop and no friction rub.  Systolic murmur present.  PULMONARY: Effort normal and breath sounds normal. No respiratory distress.No Wheezing or rales.  ABDOMEN: Soft. Bowel sounds are normal. No distension and no mass.  There is no tenderness. There is no rebound and no guarding. No HSM.  Midline incision noted   Colostomy noted  MUSCULOSKELETAL: Normal range of motion. Mild kyphosis, no tenderness.  LYMPH NODES: Has no cervical, supraclavicular, axillary and groin adenopathy.   NEUROLOGICAL: Alert and oriented to person, place, . No cranial nerve deficit.  Normal muscle tone. Coordination normal.   GENITOURINARY: Deferred exam.  SKIN: Skin is warm and dry. No rash noted. No erythema. No pallor.   EXTREMITIES: No cyanosis, no clubbing, no edema. No Deformity.  PSYCHIATRIC: Normal mood, affect and behavior.  Limited recall      Lab Results         Recent Results (from the past 240 hour(s))   INR    Collection Time: 02/26/24  7:45 AM   Result Value Ref Range    INR 2.11 (H) 0.85 - 1.15      MEDICAL EQUIPMENT NEEDS:     walker  DISCHARGE PLAN/FACE TO FACE:  I certify that services are/were furnished while this patient was under the care of a physician and that a physician or an allowed non-physician practitioner (NPP), had a face-to-face encounter that meets the physician face-to-face encounter requirements. The encounter was in whole, or in part, related to the primary reason for home health. The patient is confined to his/her home and needs intermittent skilled nursing, physical therapy, speech-language pathology, or the continued need for occupational therapy. A plan  of care has been established by a physician and is periodically reviewed by a physician.  Date of Face-to-Face Encounter: 3/4/24     I certify that, based on my findings, the following services are medically necessary home health services: HHC HHA/RN and PT/OT to evaluate and treat    My clinical findings support the need for the above skilled services because: patient will be discharging to home. Patient will need assistance with medication management and performing IADLs and ADLs effectively and safely.     Patient to re-establish plan of care with their PCP within 7 days after leaving TCU.      Electronically signed by    Jesscia Cooper MD                            Sincerely,        ALTA Silva

## 2024-03-04 NOTE — PROGRESS NOTES
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 intake  Staff has been encouraging patient to eat she is also on supplementation  Monitor weights  Education being given regarding self change of her colostomy bags    Chronic pain -now on scheduled tylenol 1 gm bid per pt request  Continue as needed Tylenol also    Chronic insomnia-discontinue trazodone  Also stop hydroxyzine  Sleeping well    Generalized anasarca/CHF-monitor wt  Weights have been stable since admission    Bilateral pleural effusion status post thoracentesis  No respiratory distress reported    A-fib with rapid ventricular response  On warfarin monitor INRs-3.1  Hold coumadin tonight   Start on lower dose tomorrow-r/c in 4d    Acute respiratory failure postoperatively currently resolved   status post bronchoscopy on 1/15/2024 and 1/20/2024    ABLA recheck hemoglobin done in the TCU stable at 10.3    Aortic stenosis status post AVR on anticoagulation    Profound hypotension /hypertension was on HCTZ and lisinopril  Meds were held in the hospital due to low blood pressures noted  Monitor BP-not on any meds   Has frequent low Bps noted    GERD continue PPI    Hyperlipidemia continue with her statins    Acute  delirium/suspected cognitive impairment   Patient is much more alert oriented and cognitively improved will continue to monitor    PCM-ON supplements  Generalized weakness-cont PT  Doing better but remains frail and weak encourage use of walker  Independent in her room        History     Patient is a very pleasant 88 year old female who was admitted to TCU  Patient was electively admitted with history of infrarenal AAA and enlarging saccular aneurysm who underwent elective aortic endovascular repair on 1/11/2024  Postoperative course complicated by A-fib with rapid ventricular response requiring initiation of amiodarone  In addition she had worsening abdominal pain and required I&D for evaluation of hemorrhage from proximal aspect of her repair on 1/12/2024  She required repeat OR intervention on 1/14/2024 with abdominal washout and fascial closure with removal of wound VAC  Subsequently she was noted to have ischemic changes in her sigmoid colon and underwent sigmoid colectomy with descending end colostomy  sHe denies any pain concerns other than chronic pain and is on Tylenol  She had bronchoscopy on 1/15/2024 and eventually was extubated  Noted to have anasarca with bilateral pleural effusion and underwent thoracentesis  Since then weights have been stable  She has not been eating much either and no longer needs oxygen  Patient is confused and a poor historian and family has elected DNR CODE STATUS  Cognitive status has improved since then patient is less confused now  Blood pressures noted to be very and she is no longer on any meds    Past Medical & Surgical History     Htn  Gerd  A fib      Past Social History     Reviewed,      Family History     Reviewed, and family history is not on file.    Medication List     Current Outpatient Medications   Medication    acetaminophen (TYLENOL) 325 MG tablet    acetaminophen (TYLENOL) 325 MG tablet    aspirin 81 MG EC tablet    Calcium Carb-Cholecalciferol (CALCIUM 500 +  D) 500-3.125 MG-MCG TABS    famotidine (PEPCID) 40 MG tablet    melatonin 5 MG tablet    nitroGLYcerin (NITROSTAT) 0.4 MG sublingual tablet    potassium chloride (KLOR-CON) 20 MEQ packet    senna-docusate (SENOKOT-S/PERICOLACE) 8.6-50 MG tablet    simvastatin (ZOCOR) 10 MG tablet    WARFARIN SODIUM PO     No current facility-administered medications for this visit.      MED REC REQUIRED  Post Medication Reconciliation Status:          Allergies     No Known Allergies    Review of Systems   A comprehensive review of 14 systems was done. Pertinent findings noted here and in history of present illness. All the rest negative.  Constitutional: Negative.  Negative for fever, chills, she has  activity change, appetite change and fatigue.   Eating poorly; blood pressures are frequently very low  HENT: Negative for congestion and facial swelling.    Eyes: Negative for photophobia, redness and visual disturbance.   Respiratory: Negative for cough and chest tightness.    Cardiovascular: Negative for chest pain, palpitations and leg swelling.   Gastrointestinal: Negative for nausea, diarrhea, constipation, blood in stool and abdominal distention.   Has a colostomy which is functional  Genitourinary: Negative.    Musculoskeletal: Negative.  Using a walker  Skin: Negative.    Neurological: Negative for dizziness, tremors, syncope, weakness, light-headedness and headaches.   Hematological: Does not bruise/bleed easily.   Psychiatric/Behavioral: Negative.  Limited recall      Physical Exam   /70   Pulse 100   Temp 98.4  F (36.9  C)   Resp 18   Ht 1.524 m (5')   Wt 42.2 kg (93 lb)   SpO2 99%   BMI 18.16 kg/m       Constitutional: Oriented to person, place,  and appears well-developed.   Frail and weak  HEENT:  Normocephalic and atraumatic.  Eyes: Conjunctivae and EOM are normal. Pupils are equal, round, and reactive to light. No discharge.  No scleral icterus. Nose normal. Mouth/Throat: Oropharynx is clear and moist. No  oropharyngeal exudate.    NECK: Normal range of motion. Neck supple. No JVD present. No tracheal deviation present. No thyromegaly present.   CARDIOVASCULAR: Normal rate, regular rhythm and intact distal pulses.  Exam reveals no gallop and no friction rub.  Systolic murmur present.  PULMONARY: Effort normal and breath sounds normal. No respiratory distress.No Wheezing or rales.  ABDOMEN: Soft. Bowel sounds are normal. No distension and no mass.  There is no tenderness. There is no rebound and no guarding. No HSM.  Midline incision noted   Colostomy noted  MUSCULOSKELETAL: Normal range of motion. Mild kyphosis, no tenderness.  LYMPH NODES: Has no cervical, supraclavicular, axillary and groin adenopathy.   NEUROLOGICAL: Alert and oriented to person, place, . No cranial nerve deficit.  Normal muscle tone. Coordination normal.   GENITOURINARY: Deferred exam.  SKIN: Skin is warm and dry. No rash noted. No erythema. No pallor.   EXTREMITIES: No cyanosis, no clubbing, no edema. No Deformity.  PSYCHIATRIC: Normal mood, affect and behavior.  Limited recall      Lab Results         Recent Results (from the past 240 hour(s))   INR    Collection Time: 02/26/24  7:45 AM   Result Value Ref Range    INR 2.11 (H) 0.85 - 1.15      MEDICAL EQUIPMENT NEEDS:     walker  DISCHARGE PLAN/FACE TO FACE:  I certify that services are/were furnished while this patient was under the care of a physician and that a physician or an allowed non-physician practitioner (NPP), had a face-to-face encounter that meets the physician face-to-face encounter requirements. The encounter was in whole, or in part, related to the primary reason for home health. The patient is confined to his/her home and needs intermittent skilled nursing, physical therapy, speech-language pathology, or the continued need for occupational therapy. A plan of care has been established by a physician and is periodically reviewed by a physician.  Date of Face-to-Face Encounter:  3/4/24     I certify that, based on my findings, the following services are medically necessary home health services: HHC HHA/RN and PT/OT to evaluate and treat    My clinical findings support the need for the above skilled services because: patient will be discharging to home. Patient will need assistance with medication management and performing IADLs and ADLs effectively and safely.     Patient to re-establish plan of care with their PCP within 7 days after leaving TCU.      Electronically signed by    Jessica Cooper MD

## 2024-03-04 NOTE — TELEPHONE ENCOUNTER
[facility] Geriatrics Triage Nurse INR     Provider: Jessica Cooper MD  Facility: [facility]   Facility Type:  TCU    Caller: Katina   Call Back Number: [phone]  Reason for call: INR  Diagnosis/Goal: A. Fib    Todays INR: 3.18  Last INR 2/26 2.11(4.5mg daily),   2/21 2.45(4.5mg daily),   2/19 4.43(hold x 2 days),   2/12 2.46(6mg daily),   2/8 1.64(6mg daily),   2/7 1.4(5mg).   Home dose: 7.5mg Tues and Sat and 5mg AOD       Heparin/Lovenox:  No  Currently on ABX?: No  Other interacting medication:  ASA  Missed or refused doses: no    Verbal Order/Direction given by Provider: Hold Warfarin on 3/4, then start 4mg daily.  Patient needs to have her INR checked this week with PCP or home care.      Provider Giving Order:  Jessica Cooper MD    Verbal Order given to: Katina Rivera RN on 3/4/2024 at 3:08 PM        Brandyn Damon RN

## 2025-01-31 ENCOUNTER — APPOINTMENT (OUTPATIENT)
Dept: ULTRASOUND IMAGING | Facility: CLINIC | Age: 89
End: 2025-01-31
Attending: EMERGENCY MEDICINE
Payer: COMMERCIAL

## 2025-01-31 ENCOUNTER — HOSPITAL ENCOUNTER (EMERGENCY)
Facility: CLINIC | Age: 89
Discharge: HOME | End: 2025-01-31
Attending: EMERGENCY MEDICINE | Admitting: EMERGENCY MEDICINE
Payer: COMMERCIAL

## 2025-01-31 VITALS
HEIGHT: 57 IN | BODY MASS INDEX: 21.36 KG/M2 | HEART RATE: 84 BPM | SYSTOLIC BLOOD PRESSURE: 146 MMHG | WEIGHT: 99 LBS | OXYGEN SATURATION: 99 % | RESPIRATION RATE: 18 BRPM | DIASTOLIC BLOOD PRESSURE: 86 MMHG | TEMPERATURE: 97.9 F

## 2025-01-31 DIAGNOSIS — S81.801A LEG WOUND, RIGHT, INITIAL ENCOUNTER: ICD-10-CM

## 2025-01-31 LAB
ANION GAP SERPL CALCULATED.3IONS-SCNC: 10 MMOL/L (ref 7–15)
BUN SERPL-MCNC: 13.5 MG/DL (ref 8–23)
CALCIUM SERPL-MCNC: 10.2 MG/DL (ref 8.8–10.4)
CHLORIDE SERPL-SCNC: 106 MMOL/L (ref 98–107)
CREAT SERPL-MCNC: 0.81 MG/DL (ref 0.51–0.95)
CRP SERPL-MCNC: 5.75 MG/L
EGFRCR SERPLBLD CKD-EPI 2021: 70 ML/MIN/1.73M2
ERYTHROCYTE [DISTWIDTH] IN BLOOD BY AUTOMATED COUNT: 14.2 % (ref 10–15)
GLUCOSE SERPL-MCNC: 98 MG/DL (ref 70–99)
HCO3 SERPL-SCNC: 23 MMOL/L (ref 22–29)
HCT VFR BLD AUTO: 36.1 % (ref 35–47)
HGB BLD-MCNC: 11.8 G/DL (ref 11.7–15.7)
INR PPP: 2.17 (ref 0.85–1.15)
MCH RBC QN AUTO: 30.3 PG (ref 26.5–33)
MCHC RBC AUTO-ENTMCNC: 32.7 G/DL (ref 31.5–36.5)
MCV RBC AUTO: 93 FL (ref 78–100)
PLATELET # BLD AUTO: 289 10E3/UL (ref 150–450)
POTASSIUM SERPL-SCNC: 4 MMOL/L (ref 3.4–5.3)
RBC # BLD AUTO: 3.89 10E6/UL (ref 3.8–5.2)
SODIUM SERPL-SCNC: 139 MMOL/L (ref 135–145)
WBC # BLD AUTO: 6.2 10E3/UL (ref 4–11)

## 2025-01-31 PROCEDURE — 85048 AUTOMATED LEUKOCYTE COUNT: CPT | Performed by: EMERGENCY MEDICINE

## 2025-01-31 PROCEDURE — 36415 COLL VENOUS BLD VENIPUNCTURE: CPT | Performed by: EMERGENCY MEDICINE

## 2025-01-31 PROCEDURE — 86140 C-REACTIVE PROTEIN: CPT | Performed by: EMERGENCY MEDICINE

## 2025-01-31 PROCEDURE — 93971 EXTREMITY STUDY: CPT | Mod: RT

## 2025-01-31 PROCEDURE — 87205 SMEAR GRAM STAIN: CPT | Performed by: EMERGENCY MEDICINE

## 2025-01-31 PROCEDURE — 85014 HEMATOCRIT: CPT | Performed by: EMERGENCY MEDICINE

## 2025-01-31 PROCEDURE — 99284 EMERGENCY DEPT VISIT MOD MDM: CPT | Mod: 25 | Performed by: EMERGENCY MEDICINE

## 2025-01-31 PROCEDURE — 85610 PROTHROMBIN TIME: CPT | Performed by: EMERGENCY MEDICINE

## 2025-01-31 PROCEDURE — 87186 SC STD MICRODIL/AGAR DIL: CPT | Performed by: EMERGENCY MEDICINE

## 2025-01-31 PROCEDURE — 80048 BASIC METABOLIC PNL TOTAL CA: CPT | Performed by: EMERGENCY MEDICINE

## 2025-01-31 RX ORDER — SULFAMETHOXAZOLE AND TRIMETHOPRIM 800; 160 MG/1; MG/1
1 TABLET ORAL 2 TIMES DAILY
Qty: 14 TABLET | Refills: 0 | Status: SHIPPED | OUTPATIENT
Start: 2025-01-31

## 2025-01-31 ASSESSMENT — COLUMBIA-SUICIDE SEVERITY RATING SCALE - C-SSRS
6. HAVE YOU EVER DONE ANYTHING, STARTED TO DO ANYTHING, OR PREPARED TO DO ANYTHING TO END YOUR LIFE?: NO
2. HAVE YOU ACTUALLY HAD ANY THOUGHTS OF KILLING YOURSELF IN THE PAST MONTH?: NO
1. IN THE PAST MONTH, HAVE YOU WISHED YOU WERE DEAD OR WISHED YOU COULD GO TO SLEEP AND NOT WAKE UP?: NO

## 2025-01-31 NOTE — ED NOTES
Primary assessment done by provider in lobby. RN discharge pt from lobby. Pt will take meds as prescribed and follow up with PMD as needed.

## 2025-01-31 NOTE — DISCHARGE INSTRUCTIONS
Start antibiotics as prescribed.  You will need close monitoring of you INR given antibiotics can cause this to fluctuate  Your wound cultures is pending.  Your blood work showed a slight increase in your inflammatory marker otherwise were reassuring  The ultrasound of your leg showed no signs of clot or serious infection.  Continue with home nursing wound care, please follow close with your doctor to further evaluate your wound and get further recommendations.

## 2025-01-31 NOTE — ED PROVIDER NOTES
EMERGENCY DEPARTMENT ENCOUNTER      NAME: Modesta Tejeda  AGE: 89 year old female  YOB: 1935  MRN: [id number]  EVALUATION DATE & TIME: No admission date for patient encounter.    PCP: Traci Donis    ED PROVIDER: Loretta Cook DO      Chief Complaint   Patient presents with    Wound Check         FINAL IMPRESSION:  1. Leg wound, right, initial encounter          ED COURSE & MEDICAL DECISION MAKING:    Pertinent Labs & Imaging studies reviewed. (See chart for details)  2:55 PM I met the patient and performed my initial interview and exam.    89 year old female presents to the Emergency Department for evaluation of enlarging wound to the right lower extremity.  Patient reports she has had this for several months, perhaps a year.  Wound care nurse coming out 3 times a week.  Sounds like today had some green discharge from the wound which made him concerned for infection.  Patient does have tenderness to her leg but reports this is not new.  I did take an image of the wound and put in the chart.  Does not look grossly infected.  Not consistent with a necrotizing infection at this time.  I did obtain a wound swab to evaluate for specific bacteria.  She is nontoxic-appearing.  She has intact distal pulses.  Venous duplex obtained and shows no acute abnormality.  On chart review it looks like she was on doxycycline a month ago.  Given some purulence to it I will cover for MRSA, will place on Bactrim.  I did discuss with patient the importance of close INR monitoring given the antibiotics.  She should follow closely with her prime provider given this chronic wound that seems to be worsening.  Again no signs of serious infection today suggest necrotizing infection or abscess.  Discussed other symptomatic care for home and return precautions.  She should continue her home health care for her wound care.    At the conclusion of the encounter I discussed the results of all of the tests and the disposition. The  questions were answered. The patient or family acknowledged understanding and was agreeable with the care plan.     Medical Decision Making  Obtained supplemental history:Supplemental history obtained?: Documented in chart  Reviewed external records: External records reviewed?: Documented in chart  Care impacted by chronic illness:Documented in Chart  Did you consider but not order tests?: Work up considered but not performed and documented in chart, if applicable  Did you interpret images independently?: Independent interpretation of ECG and images noted in documentation, when applicable.  Consultation discussion with other provider:Did you involve another provider (consultant, , pharmacy, etc.)?: No  Discharge. I prescribed additional prescription strength medication(s) as charted. I considered admission, but ultimately discharged patient after reassuring work up and exam.    MIPS: Not Applicable      MEDICATIONS GIVEN IN THE EMERGENCY:  Medications - No data to display    NEW PRESCRIPTIONS STARTED AT TODAY'S ER VISIT  New Prescriptions    SULFAMETHOXAZOLE-TRIMETHOPRIM (BACTRIM DS) 800-160 MG TABLET    Take 1 tablet by mouth 2 times daily.          =================================================================    HPI    Patient information was obtained from: Patient and son    Use of : N/A         Modesta Tejeda is a 89 year old female with a pertinent history of Paroxysmal atrial fibrillation on anticoagulation, aortic valve replacement, ischemic colitis, GERD, hypertension who presents to this ED by private vehicle for evaluation of wound to the right leg.  Patient and son report wound has been there since she was released from the hospital around a year ago.  To the right lower leg.  They have wound care coming out 3 times a week.  She has pain with removal of the wound and pain to her legs that she reports is chronic.  No increase in pain.  No fevers or chills.  No spreading redness.  Wound care  noted some green discharge from the wound today and were concerned about infection and sent patient to the ED.  Patient reports she has been placing a Mepilex over top as well as Vaseline.  She reports home care has been doing this for her as she cannot reach the area.    Per chart review, patient saw her primary care provider on 12/31/2024.  Noted enlarging venous ulcer wound.  Home care was ordered.  Doxycycline was ordered at that time for 1 week.      REVIEW OF SYSTEMS   Per HPI    PAST MEDICAL HISTORY:  History reviewed. No pertinent past medical history.    PAST SURGICAL HISTORY:  History reviewed. No pertinent surgical history.        CURRENT MEDICATIONS:    acetaminophen (TYLENOL) 325 MG tablet  acetaminophen (TYLENOL) 325 MG tablet  aspirin 81 MG EC tablet  Calcium Carb-Cholecalciferol (CALCIUM 500 + D) 500-3.125 MG-MCG TABS  famotidine (PEPCID) 40 MG tablet  melatonin 5 MG tablet  nitroGLYcerin (NITROSTAT) 0.4 MG sublingual tablet  potassium chloride (KLOR-CON) 20 MEQ packet  senna-docusate (SENOKOT-S/PERICOLACE) 8.6-50 MG tablet  simvastatin (ZOCOR) 10 MG tablet  sulfamethoxazole-trimethoprim (BACTRIM DS) 800-160 MG tablet  WARFARIN SODIUM PO         ALLERGIES:  No Known Allergies    FAMILY HISTORY:  History reviewed. No pertinent family history.    SOCIAL HISTORY:   Social History     Socioeconomic History    Marital status:    Tobacco Use    Smoking status: Unknown     Social Drivers of Health     Financial Resource Strain: Low Risk  (4/13/2023)    [facility]    Financial Resource Strain     Difficulty of Paying Living Expenses: 3   Food Insecurity: No Food Insecurity (1/11/2024)    [facility]    Food Insecurity     Do you worry your food will run out before you are able to buy more?: 1   Transportation Needs: No Transportation Needs (1/11/2024)    Received  "from [facility]    Transportation Needs     Does lack of transportation keep you from medical appointments?: 1     Does lack of transportation keep you from work, meetings or getting things that you need?: 1   Social Connections: Socially Integrated (1/11/2024)    Received from [facility]    Social Connections     Do you often feel lonely or isolated from those around you?: 0   Housing Stability: Low Risk  (1/11/2024)    Received from [facility]    Housing Stability     What is your housing situation today?: 1       VITALS:  BP (!) 146/86   Pulse 84   Temp 97.9  F (36.6  C) (Oral)   Resp 18   Ht 1.448 m (4' 9\")   Wt 44.9 kg (99 lb)   SpO2 99%   BMI 21.42 kg/m      PHYSICAL EXAM    Physical Exam  Constitutional:       General: She is not in acute distress.  HENT:      Head: Normocephalic and atraumatic.      Mouth/Throat:      Pharynx: Oropharynx is clear.   Eyes:      Pupils: Pupils are equal, round, and reactive to light.   Cardiovascular:      Rate and Rhythm: Normal rate and regular rhythm.      Pulses:           Dorsalis pedis pulses are 1+ on the right side.      Heart sounds: Normal heart sounds.   Pulmonary:      Effort: Pulmonary effort is normal.      Breath sounds: Normal breath sounds.   Abdominal:      General: Abdomen is flat. Bowel sounds are normal.      Palpations: Abdomen is soft.      Tenderness: There is no abdominal tenderness.   Musculoskeletal:         General: Normal range of motion.      Right lower leg: Edema present.   Skin:     General: Skin is warm and dry.      Capillary Refill: Capillary refill takes less than 2 seconds.      Comments: See attached picture   Neurological:      General: No focal deficit present.      Mental Status: She is alert and oriented to person, place, and time.             LAB:  All pertinent labs reviewed and interpreted.  Labs Ordered and Resulted from " Time of ED Arrival to Time of ED Departure   INR - Abnormal       Result Value    INR 2.17 (*)    CRP INFLAMMATION - Abnormal    CRP Inflammation 5.75 (*)    BASIC METABOLIC PANEL - Normal    Sodium 139      Potassium 4.0      Chloride 106      Carbon Dioxide (CO2) 23      Anion Gap 10      Urea Nitrogen 13.5      Creatinine 0.81      GFR Estimate 70      Calcium 10.2      Glucose 98     CBC WITH PLATELETS - Normal    WBC Count 6.2      RBC Count 3.89      Hemoglobin 11.8      Hematocrit 36.1      MCV 93      MCH 30.3      MCHC 32.7      RDW 14.2      Platelet Count 289     AEROBIC BACTERIAL CULTURE ROUTINE       RADIOLOGY:  Reviewed all pertinent imaging. Please see official radiology report.  US Lower Extremity Venous Duplex Right   Final Result   IMPRESSION:   1.  No deep venous thrombosis in the right lower extremity.          Loretta Cook DO  Emergency Medicine  [facility] EMERGENCY ROOM  [address] [place] [id number] [phone]  Dept: [phone]       Loretta Cook DO  01/31/25 7111

## 2025-01-31 NOTE — ED TRIAGE NOTES
The patient presents to the ED with a wound to the right lower leg that has been ongoing since March. The patient has a wound care nurse come to her house and today the nurse was concerned for infection due to some drainage on the wound and increased pain. Denies fever.

## 2025-02-03 ENCOUNTER — TELEPHONE (OUTPATIENT)
Dept: NURSING | Facility: CLINIC | Age: 89
End: 2025-02-03
Payer: COMMERCIAL

## 2025-02-03 LAB
BACTERIA WND CULT: ABNORMAL
GRAM STAIN RESULT: ABNORMAL

## 2025-02-03 NOTE — TELEPHONE ENCOUNTER
[facility]     Reason for call: Lab Result Notification     Lab Result (including Rx patient on, if applicable).  If culture, copy of lab report at bottom.  Lab Result: See Wound culture result below  ED Rx:  Sulfamethoxazole-Trimethoprim (Bactrim DS, Septra DS) 800-160 mg PO tablet,  1 tablet by mouth 2 times daily for 7 days.     Patient's current Symptoms:   Modesta states she does not believe it is any worse but not any better  Wound care nurse will be checking on her tomorrow    RN Recommendations/Instructions per [facility] ED lab result protocol:   [facility] ED lab result protocol utilized: culture  Encouraged to followup with PCP as directed by Emergency Dept Provider  She requested culture sent to her PCP    Patient/care giver notified to contact your PCP clinic or return to the Emergency department if your:  Symptoms worsen or other concerning symptoms.        Prosper Ruth RN

## 2025-05-20 ENCOUNTER — LAB REQUISITION (OUTPATIENT)
Dept: LAB | Facility: CLINIC | Age: 89
End: 2025-05-20
Payer: COMMERCIAL

## 2025-05-20 ENCOUNTER — DOCUMENTATION ONLY (OUTPATIENT)
Dept: GERIATRICS | Facility: CLINIC | Age: 89
End: 2025-05-20
Payer: COMMERCIAL

## 2025-05-20 VITALS
SYSTOLIC BLOOD PRESSURE: 96 MMHG | HEART RATE: 86 BPM | RESPIRATION RATE: 16 BRPM | DIASTOLIC BLOOD PRESSURE: 49 MMHG | TEMPERATURE: 97.2 F | HEIGHT: 60 IN | OXYGEN SATURATION: 96 % | WEIGHT: 95 LBS | BODY MASS INDEX: 18.65 KG/M2

## 2025-05-20 DIAGNOSIS — I48.91 UNSPECIFIED ATRIAL FIBRILLATION (H): ICD-10-CM

## 2025-05-20 PROBLEM — Z90.89 ACQUIRED ABSENCE OF ORGAN: Status: ACTIVE | Noted: 2024-02-07

## 2025-05-20 PROBLEM — I71.40 ABDOMINAL AORTIC ANEURYSM WITHOUT RUPTURE: Status: ACTIVE | Noted: 2024-02-07

## 2025-05-20 PROBLEM — D50.0 IRON DEFICIENCY ANEMIA DUE TO CHRONIC BLOOD LOSS: Status: ACTIVE | Noted: 2024-02-07

## 2025-05-20 PROBLEM — I67.1 SACCULAR ANEURYSM: Status: ACTIVE | Noted: 2024-03-10

## 2025-05-20 PROBLEM — S72.91XA CLOSED FRACTURE OF RIGHT FEMUR (H): Status: ACTIVE | Noted: 2025-05-09

## 2025-05-20 PROBLEM — K68.3 RETROPERITONEAL HEMATOMA: Status: ACTIVE | Noted: 2024-02-07

## 2025-05-20 PROBLEM — S81.801A OPEN WOUND OF RIGHT LOWER LEG: Status: ACTIVE | Noted: 2025-05-10

## 2025-05-20 PROBLEM — Z95.2 PRESENCE OF TRANSPLANTED HEART VALVE: Status: ACTIVE | Noted: 2024-02-07

## 2025-05-20 PROBLEM — K55.059 ACUTE VASCULAR INSUFFICIENCY OF INTESTINE: Status: ACTIVE | Noted: 2024-02-07

## 2025-05-20 PROBLEM — Z93.3 COLOSTOMY PRESENT (H): Status: ACTIVE | Noted: 2024-02-07

## 2025-05-20 PROBLEM — S80.11XA TRAUMATIC HEMATOMA OF RIGHT LOWER LEG: Status: ACTIVE | Noted: 2025-05-09

## 2025-05-20 PROBLEM — I25.10 DISORDER OF CARDIOVASCULAR SYSTEM: Status: ACTIVE | Noted: 2024-02-07

## 2025-05-20 RX ORDER — LIDOCAINE 40 MG/G
CREAM TOPICAL
COMMUNITY

## 2025-05-20 RX ORDER — OXYCODONE HYDROCHLORIDE 5 MG/1
5 TABLET ORAL EVERY 6 HOURS PRN
COMMUNITY

## 2025-05-20 RX ORDER — FUROSEMIDE 20 MG/1
20 TABLET ORAL DAILY
COMMUNITY

## 2025-05-20 RX ORDER — METOPROLOL TARTRATE 25 MG/1
12.5 TABLET, FILM COATED ORAL 2 TIMES DAILY
COMMUNITY

## 2025-05-20 NOTE — PROGRESS NOTES
[facility] GERIATRIC SERVICES  Chief Complaint   Patient presents with    [facility] F/U     [facility] Record Number:  [id number]  Place of Service where encounter took place:  [facility] ([facility]) [[id number]]  Code Status:DNR    HISTORY:      HPI:  Modesta Tejeda  is 89 year old (1935) undergoing physical and occupational therapy. She is  with a history of aortic stenosis s/p aortic valve replacement, paroxysmal atrial fibrillation, hyperlipidemia, GERD, hypertension,Excerpted from records  who was admitted on 5/9/2025 with fall and periprosthetic fracture.   She does have a chronic wound on her right lower leg above the ankle that she follows with the wound clinic for and was last seen on 5/5/2025.    CT right lower extremity confirms the acute markedly displaced periprosthetic fracture of the distal femur as well as acute hematoma in the posterior aspect of the thigh.  CT cervical spine and CT head were both unremarkable.      Cardiology was consulted for pre-op assessment. TTE with improved EF 50% relative to prior in 2024; no aortic prosthetic stenosis or other concern. Started on low-dose metoprolol for HR control.     Patient underwent open reduction internal fixation right periprosthetic distal femur shaft 5/11/25. Intermittent anemia stabilized. Bridged with heparin and warfarin.        Today she is seen to review vital signs, labs, routine visit and posthospitalization.  Admitted to the unit  following a closed fracture right distal femur and underwent an ORIF.  Denied chest pain shortness of breath cough congestion.  With a colostomy and bowel meds were changed to scheduled due to hard stool in the bag.  Tylenol  changed to extra strength scheduled 3 times daily while awake.  She is with a aortic valve and INR 2.5-3.5.  Today she was within normal limits and will continue 6-8 mg per home dose with next INR on 2/27/2025.  Reports intermittent numbness and tingling right lower extremity however  denies any calf pain.  She is also with a chronic wound right lower extremity and will have the wound care team follow her during her TCU stay.  Lisinopril currently on hold due to soft blood pressures.      ALLERGIES:Patient has no known allergies.    PAST MEDICAL HISTORY: No past medical history on file.    PAST SURGICAL HISTORY:   has no past surgical history on file.    FAMILY HISTORY: family history is not on file.    SOCIAL HISTORY:      ROS:  Constitutional: Negative for activity change, appetite change, fatigue and fever.  Nonweightbearing right lower extremity with knee immobilizer on at all times  HENT: Negative for congestion.    Respiratory: Negative for cough, shortness of breath and wheezing.    Cardiovascular: Negative for chest pain and leg swelling.   Gastrointestinal: Negative for abdominal distention, abdominal pain, constipation, diarrhea and nausea.  Colostomy  Genitourinary: Negative for dysuria.   Musculoskeletal: Negative for arthralgia. Negative for back pain.   Skin: Negative for color change and wound.  Surgical incision right femur, chronic wound right lower extremity  Neurological: Negative for dizziness.   Psychiatric/Behavioral: Negative for agitation, behavioral problems and confusion.     Physical Exam:  Constitutional:       Appearance: Patient is well-developed.   HENT:      Head: Normocephalic.   Eyes:      Conjunctiva/sclera: Conjunctivae normal.   Neck:      Musculoskeletal: Normal range of motion.   Cardiovascular:      Rate and Rhythm: Normal rate and regular rhythm.      Heart sounds: Normal heart sounds. No murmur.   Pulmonary:      Effort: No respiratory distress.      Breath sounds: Normal breath sounds. No wheezing or rales.   Abdominal:      General: Bowel sounds are normal. There is no distension.      Palpations: Abdomen is soft.      Tenderness: There is no abdominal tenderness.   Musculoskeletal:       Normal range of motion.   Nonweightbearing right lower  extremity  Skin:General:        Skin is warm.   Neurological:         Mental Status: Patient is alert and oriented to person, place, and time.   Psychiatric:         Behavior: Behavior normal.     Vitals:BP 96/49   Pulse 86   Temp 97.2  F (36.2  C)   Resp 16   Ht 1.524 m (5')   Wt 43.1 kg (95 lb)   SpO2 96%   BMI 18.55 kg/m   and Body mass index is 18.55 kg/m .    Lab/Diagnostic data:   Recent Results (from the past 240 hours)   INR    Collection Time: 05/21/25  7:30 AM   Result Value Ref Range    INR 2.03 (H) 0.85 - 1.15    PT 22.6 (H) 11.8 - 14.8 Seconds        MEDICATIONS:  MED REC REQUIRED  Post Medication Reconciliation Status: discharge medications reconciled, continue medications without change          Review of your medicines            Accurate as of May 21, 2025  5:54 PM. If you have any questions, ask your nurse or doctor.                CONTINUE these medicines which may have CHANGED, or have new prescriptions. If we are uncertain of the size of tablets/capsules you have at home, strength may be listed as something that might have changed.        Dose / Directions   WARFARIN SODIUM PO  This may have changed:   how much to take  when to take this  additional instructions      3/4/24 INR 3.18  Hold Warfarin on 3/4 then take 4mg daily.  Check INR this week with PCP or home care.    2/26/24 INR 2.11  Cont 4.5mg daily.  Next INR 3/4/24.    2/21/24 INR 2.45  Take 4.5mg daily.  Next INR 2/26/24.    2/19/24 INR 4.43  Hold Warfarin x 2 days.  Next INR 2/21/24.    2/12/24 INR 2.46  Cont 6mg daily.  Next INR 2/19/24.    2/8/24 INR 1.64  Take 6mg daily.  Next INR 2/12/24.  Refills: 0            CONTINUE these medicines which have NOT CHANGED        Dose / Directions   acetaminophen 325 MG tablet  Commonly known as: TYLENOL      Dose: 1,000 mg  Take 1,000 mg by mouth 2 times daily  Refills: 0     aspirin 81 MG EC tablet      Dose: 81 mg  Take 81 mg by mouth daily  Refills: 0     Calcium 500 + D 500-3.125  MG-MCG Tabs  Generic drug: Calcium Carb-Cholecalciferol      Dose: 1 tablet  Take 1 tablet by mouth daily  Refills: 0     famotidine 40 MG tablet  Commonly known as: PEPCID      Dose: 40 mg  Take 40 mg by mouth daily  Refills: 0     furosemide 20 MG tablet  Commonly known as: LASIX      Dose: 20 mg  Take 20 mg by mouth daily.  Refills: 0     lidocaine 4 % external cream  Commonly known as: LMX4      Apply topically once as needed for mild pain.  Refills: 0     melatonin 5 MG tablet      Dose: 5 mg  Take 5 mg by mouth nightly as needed for sleep  Refills: 0     metoprolol tartrate 25 MG tablet  Commonly known as: LOPRESSOR      Dose: 12.5 mg  Take 12.5 mg by mouth 2 times daily.  Refills: 0     nitroGLYcerin 0.4 MG sublingual tablet  Commonly known as: NITROSTAT      Dose: 0.4 mg  Place 0.4 mg under the tongue every 5 minutes as needed for chest pain For chest pain place 1 tablet under the tongue every 5 minutes for 3 doses. If symptoms persist 5 minutes after 1st dose call 911.  Refills: 0     oxyCODONE 5 MG tablet  Commonly known as: ROXICODONE      Dose: 5 mg  Take 5 mg by mouth every 6 hours as needed for severe pain.  Refills: 0     potassium chloride 20 MEQ packet  Commonly known as: KLOR-CON      Dose: 40 mEq  Take 40 mEq by mouth daily.  Refills: 0     senna-docusate 8.6-50 MG tablet  Commonly known as: SENOKOT-S/PERICOLACE      Dose: 1-2 tablet  Take 1-2 tablets by mouth 2 times daily.  Refills: 0     simvastatin 10 MG tablet  Commonly known as: ZOCOR      Dose: 10 mg  Take 10 mg by mouth at bedtime  Refills: 0     sulfamethoxazole-trimethoprim 800-160 MG tablet  Commonly known as: BACTRIM DS      Dose: 1 tablet  Take 1 tablet by mouth 2 times daily.  Quantity: 14 tablet  Refills: 0              ASSESSMENT/PLAN  Encounter Diagnoses   Name Primary?    Encounter for anticoagulation discussion and counseling Yes    Pain management     Physical deconditioning     Closed fracture of right femur with routine  healing, unspecified fracture morphology, unspecified portion of femur, subsequent encounter      Physical deconditioning PT OT    Closed fracture right distal femur S/P ORIF follow-up with orthopedics, pain management, nonweightbearing right lower extremity, knee immobilizer on at all times    Pain management scheduled extra strength Tylenol 3 times daily, lidocaine patch, as needed oxycodone    Atrial fibrillation continue Coumadin monitor and adjust per INRs Continue Metroprolol tartrate 12.5 mg twice daily    GERD on famotidine    HDL continue simvastatin      Electronically signed by: Kelley Abbott CNP

## 2025-05-21 ENCOUNTER — RESULTS FOLLOW-UP (OUTPATIENT)
Dept: GERIATRICS | Facility: CLINIC | Age: 89
End: 2025-05-21

## 2025-05-21 ENCOUNTER — LAB REQUISITION (OUTPATIENT)
Dept: LAB | Facility: CLINIC | Age: 89
End: 2025-05-21
Payer: COMMERCIAL

## 2025-05-21 ENCOUNTER — TRANSITIONAL CARE UNIT VISIT (OUTPATIENT)
Dept: GERIATRICS | Facility: CLINIC | Age: 89
End: 2025-05-21
Payer: COMMERCIAL

## 2025-05-21 DIAGNOSIS — Z51.81 ENCOUNTER FOR THERAPEUTIC DRUG LEVEL MONITORING: ICD-10-CM

## 2025-05-21 DIAGNOSIS — S72.91XD UNSPECIFIED FRACTURE OF RIGHT FEMUR, SUBSEQUENT ENCOUNTER FOR CLOSED FRACTURE WITH ROUTINE HEALING: ICD-10-CM

## 2025-05-21 DIAGNOSIS — Z71.89 ENCOUNTER FOR ANTICOAGULATION DISCUSSION AND COUNSELING: Primary | ICD-10-CM

## 2025-05-21 DIAGNOSIS — S72.91XD CLOSED FRACTURE OF RIGHT FEMUR WITH ROUTINE HEALING, UNSPECIFIED FRACTURE MORPHOLOGY, UNSPECIFIED PORTION OF FEMUR, SUBSEQUENT ENCOUNTER: ICD-10-CM

## 2025-05-21 DIAGNOSIS — R53.81 PHYSICAL DECONDITIONING: ICD-10-CM

## 2025-05-21 DIAGNOSIS — R52 PAIN MANAGEMENT: ICD-10-CM

## 2025-05-21 LAB
INR PPP: 2.03 (ref 0.85–1.15)
PROTHROMBIN TIME: 22.6 SECONDS (ref 11.8–14.8)

## 2025-05-21 PROCEDURE — P9604 ONE-WAY ALLOW PRORATED TRIP: HCPCS | Mod: ORL | Performed by: FAMILY MEDICINE

## 2025-05-21 PROCEDURE — 99310 SBSQ NF CARE HIGH MDM 45: CPT | Performed by: NURSE PRACTITIONER

## 2025-05-21 PROCEDURE — 85610 PROTHROMBIN TIME: CPT | Mod: ORL | Performed by: FAMILY MEDICINE

## 2025-05-21 PROCEDURE — 36415 COLL VENOUS BLD VENIPUNCTURE: CPT | Mod: ORL | Performed by: FAMILY MEDICINE

## 2025-05-21 RX ORDER — ACETAMINOPHEN 500 MG
1000 TABLET ORAL 3 TIMES DAILY
COMMUNITY

## 2025-05-21 NOTE — LETTER
5/21/2025      Modesta Tejeda  [address]  [facility] [place] [address]         [facility]  Chief Complaint   Patient presents with     [facility] F/U     [facility] Record Number:  [id number]  Place of Service where encounter took place:  [facility] ([facility]) [[id number]]  Code Status:DNR    HISTORY:      HPI:  Modesta Tejeda  is 89 year old (1935) undergoing physical and occupational therapy. She is  with a history of aortic stenosis s/p aortic valve replacement, paroxysmal atrial fibrillation, hyperlipidemia, GERD, hypertension,Excerpted from records  who was admitted on 5/9/2025 with fall and periprosthetic fracture.   She does have a chronic wound on her right lower leg above the ankle that she follows with the wound clinic for and was last seen on 5/5/2025.    CT right lower extremity confirms the acute markedly displaced periprosthetic fracture of the distal femur as well as acute hematoma in the posterior aspect of the thigh.  CT cervical spine and CT head were both unremarkable.      Cardiology was consulted for pre-op assessment. TTE with improved EF 50% relative to prior in 2024; no aortic prosthetic stenosis or other concern. Started on low-dose metoprolol for HR control.     Patient underwent open reduction internal fixation right periprosthetic distal femur shaft 5/11/25. Intermittent anemia stabilized. Bridged with heparin and warfarin.        Today she is seen to review vital signs, labs, routine visit and posthospitalization.  Admitted to the unit  following a closed fracture right distal femur and underwent an ORIF.  Denied chest pain shortness of breath cough congestion.  With a colostomy and bowel meds were changed to scheduled due to hard stool in the bag.  Tylenol  changed to extra strength scheduled 3 times daily while awake.  She is with a aortic valve and INR 2.5-3.5.  Today she was within normal limits and will continue 6-8 mg per home dose with  next INR on 2/27/2025.  Reports intermittent numbness and tingling right lower extremity however denies any calf pain.  She is also with a chronic wound right lower extremity and will have the wound care team follow her during her TCU stay.  Lisinopril currently on hold due to soft blood pressures.      ALLERGIES:Patient has no known allergies.    PAST MEDICAL HISTORY: No past medical history on file.    PAST SURGICAL HISTORY:   has no past surgical history on file.    FAMILY HISTORY: family history is not on file.    SOCIAL HISTORY:      ROS:  Constitutional: Negative for activity change, appetite change, fatigue and fever.  Nonweightbearing right lower extremity with knee immobilizer on at all times  HENT: Negative for congestion.    Respiratory: Negative for cough, shortness of breath and wheezing.    Cardiovascular: Negative for chest pain and leg swelling.   Gastrointestinal: Negative for abdominal distention, abdominal pain, constipation, diarrhea and nausea.  Colostomy  Genitourinary: Negative for dysuria.   Musculoskeletal: Negative for arthralgia. Negative for back pain.   Skin: Negative for color change and wound.  Surgical incision right femur, chronic wound right lower extremity  Neurological: Negative for dizziness.   Psychiatric/Behavioral: Negative for agitation, behavioral problems and confusion.     Physical Exam:  Constitutional:       Appearance: Patient is well-developed.   HENT:      Head: Normocephalic.   Eyes:      Conjunctiva/sclera: Conjunctivae normal.   Neck:      Musculoskeletal: Normal range of motion.   Cardiovascular:      Rate and Rhythm: Normal rate and regular rhythm.      Heart sounds: Normal heart sounds. No murmur.   Pulmonary:      Effort: No respiratory distress.      Breath sounds: Normal breath sounds. No wheezing or rales.   Abdominal:      General: Bowel sounds are normal. There is no distension.      Palpations: Abdomen is soft.      Tenderness: There is no abdominal  tenderness.   Musculoskeletal:       Normal range of motion.   Nonweightbearing right lower extremity  Skin:General:        Skin is warm.   Neurological:         Mental Status: Patient is alert and oriented to person, place, and time.   Psychiatric:         Behavior: Behavior normal.     Vitals:BP 96/49   Pulse 86   Temp 97.2  F (36.2  C)   Resp 16   Ht 1.524 m (5')   Wt 43.1 kg (95 lb)   SpO2 96%   BMI 18.55 kg/m   and Body mass index is 18.55 kg/m .    Lab/Diagnostic data:   Recent Results (from the past 240 hours)   INR    Collection Time: 05/21/25  7:30 AM   Result Value Ref Range    INR 2.03 (H) 0.85 - 1.15    PT 22.6 (H) 11.8 - 14.8 Seconds        MEDICATIONS:  MED REC REQUIRED  Post Medication Reconciliation Status: discharge medications reconciled, continue medications without change          Review of your medicines            Accurate as of May 21, 2025  5:54 PM. If you have any questions, ask your nurse or doctor.                CONTINUE these medicines which may have CHANGED, or have new prescriptions. If we are uncertain of the size of tablets/capsules you have at home, strength may be listed as something that might have changed.        Dose / Directions   WARFARIN SODIUM PO  This may have changed:   how much to take  when to take this  additional instructions      3/4/24 INR 3.18  Hold Warfarin on 3/4 then take 4mg daily.  Check INR this week with PCP or home care.    2/26/24 INR 2.11  Cont 4.5mg daily.  Next INR 3/4/24.    2/21/24 INR 2.45  Take 4.5mg daily.  Next INR 2/26/24.    2/19/24 INR 4.43  Hold Warfarin x 2 days.  Next INR 2/21/24.    2/12/24 INR 2.46  Cont 6mg daily.  Next INR 2/19/24.    2/8/24 INR 1.64  Take 6mg daily.  Next INR 2/12/24.  Refills: 0            CONTINUE these medicines which have NOT CHANGED        Dose / Directions   acetaminophen 325 MG tablet  Commonly known as: TYLENOL      Dose: 1,000 mg  Take 1,000 mg by mouth 2 times daily  Refills: 0     aspirin 81 MG EC  tablet      Dose: 81 mg  Take 81 mg by mouth daily  Refills: 0     Calcium 500 + D 500-3.125 MG-MCG Tabs  Generic drug: Calcium Carb-Cholecalciferol      Dose: 1 tablet  Take 1 tablet by mouth daily  Refills: 0     famotidine 40 MG tablet  Commonly known as: PEPCID      Dose: 40 mg  Take 40 mg by mouth daily  Refills: 0     furosemide 20 MG tablet  Commonly known as: LASIX      Dose: 20 mg  Take 20 mg by mouth daily.  Refills: 0     lidocaine 4 % external cream  Commonly known as: LMX4      Apply topically once as needed for mild pain.  Refills: 0     melatonin 5 MG tablet      Dose: 5 mg  Take 5 mg by mouth nightly as needed for sleep  Refills: 0     metoprolol tartrate 25 MG tablet  Commonly known as: LOPRESSOR      Dose: 12.5 mg  Take 12.5 mg by mouth 2 times daily.  Refills: 0     nitroGLYcerin 0.4 MG sublingual tablet  Commonly known as: NITROSTAT      Dose: 0.4 mg  Place 0.4 mg under the tongue every 5 minutes as needed for chest pain For chest pain place 1 tablet under the tongue every 5 minutes for 3 doses. If symptoms persist 5 minutes after 1st dose call 911.  Refills: 0     oxyCODONE 5 MG tablet  Commonly known as: ROXICODONE      Dose: 5 mg  Take 5 mg by mouth every 6 hours as needed for severe pain.  Refills: 0     potassium chloride 20 MEQ packet  Commonly known as: KLOR-CON      Dose: 40 mEq  Take 40 mEq by mouth daily.  Refills: 0     senna-docusate 8.6-50 MG tablet  Commonly known as: SENOKOT-S/PERICOLACE      Dose: 1-2 tablet  Take 1-2 tablets by mouth 2 times daily.  Refills: 0     simvastatin 10 MG tablet  Commonly known as: ZOCOR      Dose: 10 mg  Take 10 mg by mouth at bedtime  Refills: 0     sulfamethoxazole-trimethoprim 800-160 MG tablet  Commonly known as: BACTRIM DS      Dose: 1 tablet  Take 1 tablet by mouth 2 times daily.  Quantity: 14 tablet  Refills: 0              ASSESSMENT/PLAN  Encounter Diagnoses   Name Primary?     Encounter for anticoagulation discussion and counseling Yes      Pain management      Physical deconditioning      Closed fracture of right femur with routine healing, unspecified fracture morphology, unspecified portion of femur, subsequent encounter      Physical deconditioning PT OT    Closed fracture right distal femur S/P ORIF follow-up with orthopedics, pain management, nonweightbearing right lower extremity, knee immobilizer on at all times    Pain management scheduled extra strength Tylenol 3 times daily, lidocaine patch, as needed oxycodone    Atrial fibrillation continue Coumadin monitor and adjust per INRs Continue Metroprolol tartrate 12.5 mg twice daily    GERD on famotidine    HDL continue simvastatin      Electronically signed by: Kelley Abbott CNP       Sincerely,        Kelley Abbott CNP    Electronically signed

## 2025-05-22 ENCOUNTER — RESULTS FOLLOW-UP (OUTPATIENT)
Dept: GERIATRICS | Facility: CLINIC | Age: 89
End: 2025-05-22

## 2025-05-22 LAB
ANION GAP SERPL CALCULATED.3IONS-SCNC: 12 MMOL/L (ref 7–15)
BUN SERPL-MCNC: 18.9 MG/DL (ref 8–23)
CALCIUM SERPL-MCNC: 10.3 MG/DL (ref 8.8–10.4)
CHLORIDE SERPL-SCNC: 99 MMOL/L (ref 98–107)
CREAT SERPL-MCNC: 0.57 MG/DL (ref 0.51–0.95)
EGFRCR SERPLBLD CKD-EPI 2021: 87 ML/MIN/1.73M2
ERYTHROCYTE [DISTWIDTH] IN BLOOD BY AUTOMATED COUNT: 15.4 % (ref 10–15)
GLUCOSE SERPL-MCNC: 94 MG/DL (ref 70–99)
HCO3 SERPL-SCNC: 27 MMOL/L (ref 22–29)
HCT VFR BLD AUTO: 37.7 % (ref 35–47)
HGB BLD-MCNC: 11.6 G/DL (ref 11.7–15.7)
MCH RBC QN AUTO: 29.9 PG (ref 26.5–33)
MCHC RBC AUTO-ENTMCNC: 30.8 G/DL (ref 31.5–36.5)
MCV RBC AUTO: 97 FL (ref 78–100)
PLATELET # BLD AUTO: 449 10E3/UL (ref 150–450)
POTASSIUM SERPL-SCNC: 3.8 MMOL/L (ref 3.4–5.3)
RBC # BLD AUTO: 3.88 10E6/UL (ref 3.8–5.2)
SODIUM SERPL-SCNC: 138 MMOL/L (ref 135–145)
WBC # BLD AUTO: 9.2 10E3/UL (ref 4–11)

## 2025-05-22 PROCEDURE — 85027 COMPLETE CBC AUTOMATED: CPT | Mod: ORL | Performed by: NURSE PRACTITIONER

## 2025-05-22 PROCEDURE — 36415 COLL VENOUS BLD VENIPUNCTURE: CPT | Mod: ORL | Performed by: NURSE PRACTITIONER

## 2025-05-22 PROCEDURE — 80048 BASIC METABOLIC PNL TOTAL CA: CPT | Mod: ORL | Performed by: NURSE PRACTITIONER

## 2025-05-22 PROCEDURE — P9604 ONE-WAY ALLOW PRORATED TRIP: HCPCS | Mod: ORL | Performed by: NURSE PRACTITIONER

## 2025-05-26 ENCOUNTER — LAB REQUISITION (OUTPATIENT)
Dept: LAB | Facility: CLINIC | Age: 89
End: 2025-05-26
Payer: COMMERCIAL

## 2025-05-26 DIAGNOSIS — I48.91 UNSPECIFIED ATRIAL FIBRILLATION (H): ICD-10-CM

## 2025-05-27 ENCOUNTER — RESULTS FOLLOW-UP (OUTPATIENT)
Dept: GERIATRICS | Facility: CLINIC | Age: 89
End: 2025-05-27

## 2025-05-27 LAB
INR PPP: 4.26 (ref 0.85–1.15)
PROTHROMBIN TIME: 40.5 SECONDS (ref 11.8–14.8)

## 2025-05-28 ENCOUNTER — LAB REQUISITION (OUTPATIENT)
Dept: LAB | Facility: CLINIC | Age: 89
End: 2025-05-28
Payer: COMMERCIAL

## 2025-05-28 ENCOUNTER — TRANSITIONAL CARE UNIT VISIT (OUTPATIENT)
Dept: GERIATRICS | Facility: CLINIC | Age: 89
End: 2025-05-28
Payer: COMMERCIAL

## 2025-05-28 VITALS
RESPIRATION RATE: 18 BRPM | WEIGHT: 91.2 LBS | SYSTOLIC BLOOD PRESSURE: 99 MMHG | HEIGHT: 60 IN | TEMPERATURE: 98.2 F | HEART RATE: 80 BPM | DIASTOLIC BLOOD PRESSURE: 51 MMHG | BODY MASS INDEX: 17.91 KG/M2 | OXYGEN SATURATION: 96 %

## 2025-05-28 DIAGNOSIS — M97.01XD PERIPROSTHETIC FRACTURE AROUND INTERNAL PROSTHETIC RIGHT HIP JOINT, SUBSEQUENT ENCOUNTER: ICD-10-CM

## 2025-05-28 DIAGNOSIS — R53.81 PHYSICAL DECONDITIONING: ICD-10-CM

## 2025-05-28 DIAGNOSIS — Z51.81 ENCOUNTER FOR THERAPEUTIC DRUG LEVEL MONITORING: ICD-10-CM

## 2025-05-28 DIAGNOSIS — R52 PAIN MANAGEMENT: Primary | ICD-10-CM

## 2025-05-28 PROBLEM — S72.91XA CLOSED FRACTURE OF RIGHT FEMUR (H): Status: RESOLVED | Noted: 2025-05-09 | Resolved: 2025-05-28

## 2025-05-28 PROBLEM — L97.812 NON-PRESSURE CHRONIC ULCER OF OTHER PART OF RIGHT LOWER LEG WITH FAT LAYER EXPOSED (H): Status: ACTIVE | Noted: 2025-02-28

## 2025-05-28 PROBLEM — S81.801A OPEN WOUND OF RIGHT LOWER LEG: Status: RESOLVED | Noted: 2025-05-10 | Resolved: 2025-05-28

## 2025-05-28 PROBLEM — S80.11XA TRAUMATIC HEMATOMA OF RIGHT LOWER LEG: Status: RESOLVED | Noted: 2025-05-09 | Resolved: 2025-05-28

## 2025-05-28 PROBLEM — L03.116 CELLULITIS OF LEFT LOWER LIMB: Status: ACTIVE | Noted: 2025-01-03

## 2025-05-28 PROBLEM — I83.018 VARICOSE VEINS OF RIGHT LOWER EXTREMITY WITH ULCER OTHER PART OF LOWER LEG (CODE) (H): Status: ACTIVE | Noted: 2025-02-28

## 2025-05-28 PROCEDURE — 99309 SBSQ NF CARE MODERATE MDM 30: CPT | Performed by: NURSE PRACTITIONER

## 2025-05-28 NOTE — PROGRESS NOTES
[facility] [address] [facility]          PHYSICIAN ASSISTANT NOTE       Date of evaluation: 2/19/2023    Chief Complaint     Abdominal Pain      History of Present Illness     Lori Polanco is a 30 y.o. female with PMH of ADHD, Iron and B12 deficiency who presents abdominal pain. Patient reports that she was sent home yesterday due to feeling lightheaded and faint. She reports that when she woke up this morning, she had a pain in her right lower abdomen without radiation. This pain has been intermittently sharp and aching. It is worse with movement. She denies associated fever, chills, n/v, change in appetite, diarrhea, constipation, dysuria, hematuria. LMP 2/12. No vaginal complaints. No prior abdominal surgeries. She was evaluated at [facility] ED and sent here for further evaluation as their CT scanner is down. ASSESSMENT / PLAN  (MEDICAL DECISION MAKING)     INITIAL VITALS: BP: 135/85, Temp: 98.2 °F (36.8 °C), Heart Rate: 88, Resp: 16, SpO2: 100 %    Lori Polanco is a 30 y.o. female who presents for evaluation of RLQ abdominal pain without associated symptoms. Vitals stable. Exam with TTP RLQ without rebound or guarding. Laboratory studies performed at [facility] revealed no acute findings. Urine HCG negative. UA negative for infection. Wet prep negative. CT A/P ordered revealed physiologic fluid in pelvis. No appendicitis. On reassessment, patient continues to feel overall improved. Low suspicion for ovarian torsion with negative CT and exam. Anticipate patient will be discharged home with instructions on NSAIDs and outpatient follow-up. Medical Decision Making  Problems Addressed:  Right lower quadrant abdominal pain: acute illness or injury    Amount and/or Complexity of Data Reviewed  Labs: ordered. Decision-making details documented in ED Course. Radiology: ordered. Risk  Prescription drug management. This patient was also evaluated by the attending physician.  [facility] GERIATRIC [facility]  Chief Complaint   Patient presents with    CHATO     [place] Medical Record Number:  [id number]  Place of Service where encounter took place:  [facility] ([facility]) [[id number]]  Code Status:DNR    HISTORY:      HPI:  Modesta Tejeda  is 89 year old (1935) undergoing physical and occupational therapy. She is  with a history of aortic stenosis s/p aortic valve replacement, paroxysmal atrial fibrillation, hyperlipidemia, GERD, hypertension,Excerpted from records  who was admitted on 5/9/2025 with fall and periprosthetic fracture.   She does have a chronic wound on her right lower leg above the ankle that she follows with the wound clinic for and was last seen on 5/5/2025.    CT right lower extremity confirms the acute markedly displaced periprosthetic fracture of the distal femur as well as acute hematoma in the posterior aspect of the thigh.  CT cervical spine and CT head were both unremarkable.      Cardiology was consulted for pre-op assessment. TTE with improved EF 50% relative to prior in 2024; no aortic prosthetic stenosis or other concern. Started on low-dose metoprolol for HR control.     Patient underwent open reduction internal fixation right periprosthetic distal femur shaft 5/11/25. Intermittent anemia stabilized. Bridged with heparin and warfarin.        Today she is seen to review vital signs, labs, routine visit.  Admitted to the unit  following a closed fracture right distal femur and underwent an ORIF.  Denied chest pain shortness of breath cough congestion.   She is with a colostomy.  She is with a aortic valve and INR 2.5-3.5. INR due 5/29.  Reports intermittent numbness and tingling right lower extremity however denies any calf pain.  She is also with a chronic wound right lower extremity and will have the wound care team follow her during her TCU stay.  Lisinopril currently on hold due to soft blood pressures. She is with a hinged knee brace. She reports her  care plans were discussed and agreed upon. Clinical Impression     1. Right lower quadrant abdominal pain        Disposition     PATIENT REFERRED TO:  No follow-up provider specified.     DISCHARGE MEDICATIONS:  New Prescriptions    No medications on file       DISPOSITION Decision To Transfer 02/19/2023 12:12:27 PM        Diagnostic Results and Other Data     RADIOLOGY:  No orders to display       LABS:   Results for orders placed or performed during the hospital encounter of 02/19/23   Wet prep, genital    Specimen: Vaginal   Result Value Ref Range    Trichomonas Prep None Seen     Yeast, Wet Prep None Seen     Clue Cells, Wet Prep None Seen     WBC, Wet Prep 1+     RBC, Wet Prep 1+     Epi Cells 2+     Bacteria 4+     Source Wet Prep Vaginal    Pregnancy, Urine   Result Value Ref Range    HCG(Urine) Pregnancy Test Negative Detects HCG level >20 MIU/mL   Urinalysis with Reflex to Culture    Specimen: Urine   Result Value Ref Range    Color, UA Yellow Straw/Yellow    Clarity, UA Clear Clear    Glucose, Ur Negative Negative mg/dL    Bilirubin Urine Negative Negative    Ketones, Urine Negative Negative mg/dL    Specific Gravity, UA <=1.005 1.005 - 1.030    Blood, Urine TRACE-INTACT (A) Negative    pH, UA 6.5 5.0 - 8.0    Protein, UA Negative Negative mg/dL    Urobilinogen, Urine 0.2 <2.0 E.U./dL    Nitrite, Urine Negative Negative    Leukocyte Esterase, Urine Negative Negative    Microscopic Examination YES     Urine Type NotGiven     Urine Reflex to Culture Not Indicated    Microscopic Urinalysis   Result Value Ref Range    WBC, UA 0-2 0 - 5 /HPF    RBC, UA 0-2 0 - 4 /HPF    Epithelial Cells, UA 0-1 0 - 5 /HPF    Bacteria, UA Rare (A) None Seen /HPF   CBC with Auto Differential   Result Value Ref Range    WBC 9.8 4.0 - 11.0 K/uL    RBC 4.37 4.00 - 5.20 M/uL    Hemoglobin 11.9 (L) 12.0 - 16.0 g/dL    Hematocrit 36.5 36.0 - 48.0 %    MCV 83.5 80.0 - 100.0 fL    MCH 27.2 26.0 - 34.0 pg    MCHC 32.6 31.0 - 36.0 g/dL pain  is controlled. Weights reviewed ans she is down 4 pounds over the last week      ALLERGIES:Patient has no known allergies.    PAST MEDICAL HISTORY: No past medical history on file.    PAST SURGICAL HISTORY:   has no past surgical history on file.    FAMILY HISTORY: family history is not on file.    SOCIAL HISTORY:      ROS:  Constitutional: Negative for activity change, appetite change, fatigue and fever.  Nonweightbearing right lower extremity with knee immobilizer on at all times  HENT: Negative for congestion.    Respiratory: Negative for cough, shortness of breath and wheezing.    Cardiovascular: Negative for chest pain and leg swelling.   Gastrointestinal: Negative for abdominal distention, abdominal pain, constipation, diarrhea and nausea.  Colostomy  Genitourinary: Negative for dysuria.   Musculoskeletal: Negative for arthralgia. Negative for back pain.   Skin: Negative for color change and wound.  Surgical incision right femur, chronic wound right lower extremity  Neurological: Negative for dizziness.   Psychiatric/Behavioral: Negative for agitation, behavioral problems and confusion.     Physical Exam:  Constitutional:       Appearance: Patient is well-developed.   HENT:      Head: Normocephalic.   Eyes:      Conjunctiva/sclera: Conjunctivae normal.   Neck:      Musculoskeletal: Normal range of motion.   Cardiovascular:      Rate and Rhythm: Normal rate and regular rhythm.      Heart sounds: Normal heart sounds. No murmur.   Pulmonary:      Effort: No respiratory distress.      Breath sounds: Normal breath sounds. No wheezing or rales.   Abdominal:      General: Bowel sounds are normal. There is no distension.      Palpations: Abdomen is soft.      Tenderness: There is no abdominal tenderness.   Musculoskeletal:       Normal range of motion.   Nonweightbearing right lower extremity  Skin:General:        Skin is warm.   Neurological:         Mental Status: Patient is alert and oriented to person,  RDW 14.2 12.4 - 15.4 %    Platelets 657 373 - 130 K/uL    MPV 8.0 5.0 - 10.5 fL    Neutrophils % 78.0 %    Lymphocytes % 11.7 %    Monocytes % 8.6 %    Eosinophils % 1.5 %    Basophils % 0.2 %    Neutrophils Absolute 7.6 1.7 - 7.7 K/uL    Lymphocytes Absolute 1.1 1.0 - 5.1 K/uL    Monocytes Absolute 0.8 0.0 - 1.3 K/uL    Eosinophils Absolute 0.1 0.0 - 0.6 K/uL    Basophils Absolute 0.0 0.0 - 0.2 K/uL   CMP w/ Reflex to MG   Result Value Ref Range    Sodium 141 136 - 145 mmol/L    Potassium reflex Magnesium 4.4 3.5 - 5.1 mmol/L    Chloride 107 99 - 110 mmol/L    CO2 28 21 - 32 mmol/L    Anion Gap 6 3 - 16    Glucose 99 70 - 99 mg/dL    BUN 8 7 - 20 mg/dL    Creatinine 0.7 0.6 - 1.1 mg/dL    Est, Glom Filt Rate >60 >60    Calcium 9.8 8.3 - 10.6 mg/dL    Total Protein 7.7 6.4 - 8.2 g/dL    Albumin 4.4 3.4 - 5.0 g/dL    Albumin/Globulin Ratio 1.3 1.1 - 2.2    Total Bilirubin <0.2 0.0 - 1.0 mg/dL    Alkaline Phosphatase 47 40 - 129 U/L    ALT 9 (L) 10 - 40 U/L    AST 17 15 - 37 U/L     ED BEDSIDE ULTRASOUND:  No results found. RECENT VITALS:  BP: 135/85, Temp: 98.2 °F (36.8 °C), Heart Rate: 88, Resp: 16, SpO2: 100 %     Procedures         ED Course     Nursing Notes, Past Medical Hx,Past Surgical Hx, Social Hx, Allergies, and Family Hx were reviewed. The patient was given the following medications:  No orders of the defined types were placed in this encounter. CONSULTS:  None    Review of Systems     Review of Systems   Constitutional:  Negative for chills and fever. HENT:  Negative for congestion. Eyes:  Negative for redness. Respiratory:  Negative for shortness of breath. Cardiovascular:  Negative for chest pain. Gastrointestinal:  Positive for abdominal pain. Negative for nausea and vomiting. Genitourinary:  Negative for difficulty urinating. Musculoskeletal:  Negative for gait problem. Skin:  Negative for wound. Neurological:  Positive for light-headedness.  Negative for place, and time.   Psychiatric:         Behavior: Behavior normal.     Vitals:BP 99/51   Pulse 80   Temp 98.2  F (36.8  C)   Resp 18   Ht 1.524 m (5')   Wt 41.4 kg (91 lb 3.2 oz)   SpO2 96%   BMI 17.81 kg/m   and Body mass index is 17.81 kg/m .    Lab/Diagnostic data:   Recent Results (from the past 240 hours)   INR    Collection Time: 05/21/25  7:30 AM   Result Value Ref Range    INR 2.03 (H) 0.85 - 1.15    PT 22.6 (H) 11.8 - 14.8 Seconds   CBC with platelets    Collection Time: 05/22/25  7:29 AM   Result Value Ref Range    WBC Count 9.2 4.0 - 11.0 10e3/uL    RBC Count 3.88 3.80 - 5.20 10e6/uL    Hemoglobin 11.6 (L) 11.7 - 15.7 g/dL    Hematocrit 37.7 35.0 - 47.0 %    MCV 97 78 - 100 fL    MCH 29.9 26.5 - 33.0 pg    MCHC 30.8 (L) 31.5 - 36.5 g/dL    RDW 15.4 (H) 10.0 - 15.0 %    Platelet Count 449 150 - 450 10e3/uL   Glucose (OUTREACH)    Collection Time: 05/22/25  7:29 AM   Result Value Ref Range    Glucose 94 70 - 99 mg/dL   Basic Metabolic Panel No Glucose (OUTREACH)    Collection Time: 05/22/25  7:29 AM   Result Value Ref Range    Sodium 138 135 - 145 mmol/L    Potassium 3.8 3.4 - 5.3 mmol/L    Chloride 99 98 - 107 mmol/L    Carbon Dioxide (CO2) 27 22 - 29 mmol/L    Anion Gap 12 7 - 15 mmol/L    Urea Nitrogen 18.9 8.0 - 23.0 mg/dL    Creatinine 0.57 0.51 - 0.95 mg/dL    GFR Estimate 87 >60 mL/min/1.73m2    Calcium 10.3 8.8 - 10.4 mg/dL   INR    Collection Time: 05/27/25  6:00 AM   Result Value Ref Range    INR 4.26 (H) 0.85 - 1.15    PT 40.5 (H) 11.8 - 14.8 Seconds        MEDICATIONS:  MED REC REQUIRED  Post Medication Reconciliation Status: discharge medications reconciled, continue medications without change          Review of your medicines            Accurate as of May 28, 2025 10:08 AM. If you have any questions, ask your nurse or doctor.                CONTINUE these medicines which have NOT CHANGED        Dose / Directions   acetaminophen 500 MG tablet  Commonly known as: TYLENOL      Dose: 1,000  dizziness. Psychiatric/Behavioral:  The patient is not nervous/anxious. Past Medical, Surgical, Family, and Social History     She has a past medical history of ADHD and Anxiety. She has no past surgical history on file. Her family history is not on file. She reports that she has never smoked. She has never used smokeless tobacco. She reports that she does not currently use alcohol. She reports that she does not currently use drugs. Medications     Previous Medications    No medications on file       Allergies     She is allergic to pcn [penicillins]. Physical Exam     INITIAL VITALS: BP: 135/85, Temp: 98.2 °F (36.8 °C), Heart Rate: 88, Resp: 16, SpO2: 100 %  Physical Exam  Vitals and nursing note reviewed. Constitutional:       General: She is not in acute distress. HENT:      Head: Normocephalic and atraumatic. Eyes:      Extraocular Movements: Extraocular movements intact. Conjunctiva/sclera: Conjunctivae normal.   Cardiovascular:      Rate and Rhythm: Normal rate and regular rhythm. Pulmonary:      Effort: Pulmonary effort is normal. No respiratory distress. Breath sounds: Normal breath sounds. No wheezing, rhonchi or rales. Abdominal:      General: Bowel sounds are normal. There is no distension. Palpations: Abdomen is soft. Tenderness: There is abdominal tenderness in the right lower quadrant. There is no guarding or rebound. Musculoskeletal:         General: No deformity. Cervical back: Neck supple. Skin:     General: Skin is warm and dry. Neurological:      Mental Status: She is alert and oriented to person, place, and time.    Psychiatric:         Mood and Affect: Mood normal.         Behavior: Behavior normal.          Linwood Gordon PA-C  02/19/23 5188 mg  Take 1,000 mg by mouth 3 times daily.  Refills: 0     aspirin 81 MG EC tablet      Dose: 81 mg  Take 81 mg by mouth daily  Refills: 0     Calcium 500 + D 500-3.125 MG-MCG Tabs  Generic drug: Calcium Carb-Cholecalciferol      Dose: 1 tablet  Take 1 tablet by mouth daily  Refills: 0     famotidine 40 MG tablet  Commonly known as: PEPCID      Dose: 40 mg  Take 40 mg by mouth daily  Refills: 0     furosemide 20 MG tablet  Commonly known as: LASIX      Dose: 20 mg  Take 20 mg by mouth daily.  Refills: 0     lidocaine 4 % external cream  Commonly known as: LMX4      Apply topically once as needed for mild pain.  Refills: 0     metoprolol tartrate 25 MG tablet  Commonly known as: LOPRESSOR      Dose: 12.5 mg  Take 12.5 mg by mouth 2 times daily.  Refills: 0     nitroGLYcerin 0.4 MG sublingual tablet  Commonly known as: NITROSTAT      Dose: 0.4 mg  Place 0.4 mg under the tongue every 5 minutes as needed for chest pain For chest pain place 1 tablet under the tongue every 5 minutes for 3 doses. If symptoms persist 5 minutes after 1st dose call 911.  Refills: 0     oxyCODONE 5 MG tablet  Commonly known as: ROXICODONE      Dose: 5 mg  Take 5 mg by mouth every 6 hours as needed for severe pain.  Refills: 0     potassium chloride 20 MEQ packet  Commonly known as: KLOR-CON      Dose: 40 mEq  Take 40 mEq by mouth daily.  Refills: 0     senna-docusate 8.6-50 MG tablet  Commonly known as: SENOKOT-S/PERICOLACE      Dose: 1-2 tablet  Take 1-2 tablets by mouth 2 times daily.  Refills: 0     simvastatin 10 MG tablet  Commonly known as: ZOCOR      Dose: 10 mg  Take 10 mg by mouth at bedtime  Refills: 0     WARFARIN SODIUM PO      3/4/24 INR 3.18  Hold Warfarin on 3/4 then take 4mg daily.  Check INR this week with PCP or home care.    2/26/24 INR 2.11  Cont 4.5mg daily.  Next INR 3/4/24.    2/21/24 INR 2.45  Take 4.5mg daily.  Next INR 2/26/24.    2/19/24 INR 4.43  Hold Warfarin x 2 days.  Next INR 2/21/24.    2/12/24 INR 2.46  Cont  6mg daily.  Next INR 2/19/24.    2/8/24 INR 1.64  Take 6mg daily.  Next INR 2/12/24.  Refills: 0              ASSESSMENT/PLAN  Encounter Diagnoses   Name Primary?    Pain management Yes    Physical deconditioning     Periprosthetic fracture around internal prosthetic right hip joint, subsequent encounter        Physical deconditioning PT OT    Closed fracture right distal femur S/P ORIF follow-up with orthopedics, pain management, nonweightbearing right lower extremity, knee immobilizer on at all times    Pain management scheduled extra strength Tylenol 3 times daily, lidocaine patch, as needed oxycodone    Atrial fibrillation continue Coumadin monitor and adjust per INRs Continue Metroprolol tartrate 12.5 mg twice daily    GERD on famotidine    HDL continue simvastatin      Electronically signed by: Kelley Abbott CNP

## 2025-05-28 NOTE — LETTER
5/28/2025      Modesta Tejeda  [address]  [facility] [address]        [facility] GERIATRIC SERVICES  Chief Complaint   Patient presents with     [facility] Medical Record Number:  [id number]  Place of Service where encounter took place:  [facility] ([facility]) [[id number]]  Code Status:DNR    HISTORY:      HPI:  Modesta Tejeda  is 89 year old (1935) undergoing physical and occupational therapy. She is  with a history of aortic stenosis s/p aortic valve replacement, paroxysmal atrial fibrillation, hyperlipidemia, GERD, hypertension,Excerpted from records  who was admitted on 5/9/2025 with fall and periprosthetic fracture.   She does have a chronic wound on her right lower leg above the ankle that she follows with the wound clinic for and was last seen on 5/5/2025.    CT right lower extremity confirms the acute markedly displaced periprosthetic fracture of the distal femur as well as acute hematoma in the posterior aspect of the thigh.  CT cervical spine and CT head were both unremarkable.      Cardiology was consulted for pre-op assessment. TTE with improved EF 50% relative to prior in 2024; no aortic prosthetic stenosis or other concern. Started on low-dose metoprolol for HR control.     Patient underwent open reduction internal fixation right periprosthetic distal femur shaft 5/11/25. Intermittent anemia stabilized. Bridged with heparin and warfarin.        Today she is seen to review vital signs, labs, routine visit.  Admitted to the unit  following a closed fracture right distal femur and underwent an ORIF.  Denied chest pain shortness of breath cough congestion.   She is with a colostomy.  She is with a aortic valve and INR 2.5-3.5. INR due 5/29.  Reports intermittent numbness and tingling right lower extremity however denies any calf pain.  She is also with a chronic wound right lower extremity and will have the wound care team follow her during her TCU stay.  Lisinopril  currently on hold due to soft blood pressures. She is with a hinged knee brace. She reports her pain  is controlled. Weights reviewed ans she is down 4 pounds over the last week      ALLERGIES:Patient has no known allergies.    PAST MEDICAL HISTORY: No past medical history on file.    PAST SURGICAL HISTORY:   has no past surgical history on file.    FAMILY HISTORY: family history is not on file.    SOCIAL HISTORY:      ROS:  Constitutional: Negative for activity change, appetite change, fatigue and fever.  Nonweightbearing right lower extremity with knee immobilizer on at all times  HENT: Negative for congestion.    Respiratory: Negative for cough, shortness of breath and wheezing.    Cardiovascular: Negative for chest pain and leg swelling.   Gastrointestinal: Negative for abdominal distention, abdominal pain, constipation, diarrhea and nausea.  Colostomy  Genitourinary: Negative for dysuria.   Musculoskeletal: Negative for arthralgia. Negative for back pain.   Skin: Negative for color change and wound.  Surgical incision right femur, chronic wound right lower extremity  Neurological: Negative for dizziness.   Psychiatric/Behavioral: Negative for agitation, behavioral problems and confusion.     Physical Exam:  Constitutional:       Appearance: Patient is well-developed.   HENT:      Head: Normocephalic.   Eyes:      Conjunctiva/sclera: Conjunctivae normal.   Neck:      Musculoskeletal: Normal range of motion.   Cardiovascular:      Rate and Rhythm: Normal rate and regular rhythm.      Heart sounds: Normal heart sounds. No murmur.   Pulmonary:      Effort: No respiratory distress.      Breath sounds: Normal breath sounds. No wheezing or rales.   Abdominal:      General: Bowel sounds are normal. There is no distension.      Palpations: Abdomen is soft.      Tenderness: There is no abdominal tenderness.   Musculoskeletal:       Normal range of motion.   Nonweightbearing right lower extremity  Skin:General:         Skin is warm.   Neurological:         Mental Status: Patient is alert and oriented to person, place, and time.   Psychiatric:         Behavior: Behavior normal.     Vitals:BP 99/51   Pulse 80   Temp 98.2  F (36.8  C)   Resp 18   Ht 1.524 m (5')   Wt 41.4 kg (91 lb 3.2 oz)   SpO2 96%   BMI 17.81 kg/m   and Body mass index is 17.81 kg/m .    Lab/Diagnostic data:   Recent Results (from the past 240 hours)   INR    Collection Time: 05/21/25  7:30 AM   Result Value Ref Range    INR 2.03 (H) 0.85 - 1.15    PT 22.6 (H) 11.8 - 14.8 Seconds   CBC with platelets    Collection Time: 05/22/25  7:29 AM   Result Value Ref Range    WBC Count 9.2 4.0 - 11.0 10e3/uL    RBC Count 3.88 3.80 - 5.20 10e6/uL    Hemoglobin 11.6 (L) 11.7 - 15.7 g/dL    Hematocrit 37.7 35.0 - 47.0 %    MCV 97 78 - 100 fL    MCH 29.9 26.5 - 33.0 pg    MCHC 30.8 (L) 31.5 - 36.5 g/dL    RDW 15.4 (H) 10.0 - 15.0 %    Platelet Count 449 150 - 450 10e3/uL   Glucose (OUTREACH)    Collection Time: 05/22/25  7:29 AM   Result Value Ref Range    Glucose 94 70 - 99 mg/dL   Basic Metabolic Panel No Glucose (OUTREACH)    Collection Time: 05/22/25  7:29 AM   Result Value Ref Range    Sodium 138 135 - 145 mmol/L    Potassium 3.8 3.4 - 5.3 mmol/L    Chloride 99 98 - 107 mmol/L    Carbon Dioxide (CO2) 27 22 - 29 mmol/L    Anion Gap 12 7 - 15 mmol/L    Urea Nitrogen 18.9 8.0 - 23.0 mg/dL    Creatinine 0.57 0.51 - 0.95 mg/dL    GFR Estimate 87 >60 mL/min/1.73m2    Calcium 10.3 8.8 - 10.4 mg/dL   INR    Collection Time: 05/27/25  6:00 AM   Result Value Ref Range    INR 4.26 (H) 0.85 - 1.15    PT 40.5 (H) 11.8 - 14.8 Seconds        MEDICATIONS:  MED REC REQUIRED  Post Medication Reconciliation Status: discharge medications reconciled, continue medications without change          Review of your medicines            Accurate as of May 28, 2025 10:08 AM. If you have any questions, ask your nurse or doctor.                CONTINUE these medicines which have NOT CHANGED         Dose / Directions   acetaminophen 500 MG tablet  Commonly known as: TYLENOL      Dose: 1,000 mg  Take 1,000 mg by mouth 3 times daily.  Refills: 0     aspirin 81 MG EC tablet      Dose: 81 mg  Take 81 mg by mouth daily  Refills: 0     Calcium 500 + D 500-3.125 MG-MCG Tabs  Generic drug: Calcium Carb-Cholecalciferol      Dose: 1 tablet  Take 1 tablet by mouth daily  Refills: 0     famotidine 40 MG tablet  Commonly known as: PEPCID      Dose: 40 mg  Take 40 mg by mouth daily  Refills: 0     furosemide 20 MG tablet  Commonly known as: LASIX      Dose: 20 mg  Take 20 mg by mouth daily.  Refills: 0     lidocaine 4 % external cream  Commonly known as: LMX4      Apply topically once as needed for mild pain.  Refills: 0     metoprolol tartrate 25 MG tablet  Commonly known as: LOPRESSOR      Dose: 12.5 mg  Take 12.5 mg by mouth 2 times daily.  Refills: 0     nitroGLYcerin 0.4 MG sublingual tablet  Commonly known as: NITROSTAT      Dose: 0.4 mg  Place 0.4 mg under the tongue every 5 minutes as needed for chest pain For chest pain place 1 tablet under the tongue every 5 minutes for 3 doses. If symptoms persist 5 minutes after 1st dose call 911.  Refills: 0     oxyCODONE 5 MG tablet  Commonly known as: ROXICODONE      Dose: 5 mg  Take 5 mg by mouth every 6 hours as needed for severe pain.  Refills: 0     potassium chloride 20 MEQ packet  Commonly known as: KLOR-CON      Dose: 40 mEq  Take 40 mEq by mouth daily.  Refills: 0     senna-docusate 8.6-50 MG tablet  Commonly known as: SENOKOT-S/PERICOLACE      Dose: 1-2 tablet  Take 1-2 tablets by mouth 2 times daily.  Refills: 0     simvastatin 10 MG tablet  Commonly known as: ZOCOR      Dose: 10 mg  Take 10 mg by mouth at bedtime  Refills: 0     WARFARIN SODIUM PO      3/4/24 INR 3.18  Hold Warfarin on 3/4 then take 4mg daily.  Check INR this week with PCP or home care.    2/26/24 INR 2.11  Cont 4.5mg daily.  Next INR 3/4/24.    2/21/24 INR 2.45  Take 4.5mg daily.  Next INR 2/26/24.     2/19/24 INR 4.43  Hold Warfarin x 2 days.  Next INR 2/21/24.    2/12/24 INR 2.46  Cont 6mg daily.  Next INR 2/19/24.    2/8/24 INR 1.64  Take 6mg daily.  Next INR 2/12/24.  Refills: 0              ASSESSMENT/PLAN  Encounter Diagnoses   Name Primary?     Pain management Yes     Physical deconditioning      Periprosthetic fracture around internal prosthetic right hip joint, subsequent encounter        Physical deconditioning PT OT    Closed fracture right distal femur S/P ORIF follow-up with orthopedics, pain management, nonweightbearing right lower extremity, knee immobilizer on at all times    Pain management scheduled extra strength Tylenol 3 times daily, lidocaine patch, as needed oxycodone    Atrial fibrillation continue Coumadin monitor and adjust per INRs Continue Metroprolol tartrate 12.5 mg twice daily    GERD on famotidine    HDL continue simvastatin      Electronically signed by: Kelley Abbott CNP       Sincerely,        Kelley Abbott CNP    Electronically signed

## 2025-05-29 ENCOUNTER — RESULTS FOLLOW-UP (OUTPATIENT)
Dept: GERIATRICS | Facility: CLINIC | Age: 89
End: 2025-05-29

## 2025-05-29 ENCOUNTER — LAB REQUISITION (OUTPATIENT)
Dept: LAB | Facility: CLINIC | Age: 89
End: 2025-05-29
Payer: COMMERCIAL

## 2025-05-29 DIAGNOSIS — R52 PAIN: Primary | ICD-10-CM

## 2025-05-29 DIAGNOSIS — Z79.01 LONG TERM (CURRENT) USE OF ANTICOAGULANTS: ICD-10-CM

## 2025-05-29 LAB
INR PPP: 3.23 (ref 0.85–1.15)
PROTHROMBIN TIME: 32.1 SECONDS (ref 11.8–14.8)

## 2025-05-29 PROCEDURE — P9603 ONE-WAY ALLOW PRORATED MILES: HCPCS | Mod: ORL | Performed by: NURSE PRACTITIONER

## 2025-05-29 PROCEDURE — 85610 PROTHROMBIN TIME: CPT | Mod: ORL | Performed by: NURSE PRACTITIONER

## 2025-05-29 PROCEDURE — 36415 COLL VENOUS BLD VENIPUNCTURE: CPT | Mod: ORL | Performed by: NURSE PRACTITIONER

## 2025-05-29 RX ORDER — OXYCODONE HYDROCHLORIDE 5 MG/1
5 TABLET ORAL EVERY 6 HOURS PRN
Qty: 30 TABLET | Refills: 0 | Status: SHIPPED | OUTPATIENT
Start: 2025-05-29

## 2025-05-30 ENCOUNTER — TRANSITIONAL CARE UNIT VISIT (OUTPATIENT)
Dept: GERIATRICS | Facility: CLINIC | Age: 89
End: 2025-05-30
Payer: COMMERCIAL

## 2025-05-30 VITALS
SYSTOLIC BLOOD PRESSURE: 96 MMHG | DIASTOLIC BLOOD PRESSURE: 50 MMHG | BODY MASS INDEX: 17.87 KG/M2 | HEART RATE: 72 BPM | RESPIRATION RATE: 16 BRPM | OXYGEN SATURATION: 96 % | TEMPERATURE: 97.6 F | WEIGHT: 91 LBS | HEIGHT: 60 IN

## 2025-05-30 DIAGNOSIS — M62.838 MUSCLE SPASM: ICD-10-CM

## 2025-05-30 DIAGNOSIS — K59.00 CONSTIPATION, UNSPECIFIED CONSTIPATION TYPE: ICD-10-CM

## 2025-05-30 DIAGNOSIS — R52 PAIN MANAGEMENT: Primary | ICD-10-CM

## 2025-05-30 LAB
INR PPP: 2.03 (ref 0.85–1.15)
PROTHROMBIN TIME: 23.1 SECONDS (ref 11.8–14.8)

## 2025-05-30 PROCEDURE — 85610 PROTHROMBIN TIME: CPT | Mod: ORL | Performed by: FAMILY MEDICINE

## 2025-05-30 PROCEDURE — P9604 ONE-WAY ALLOW PRORATED TRIP: HCPCS | Mod: ORL | Performed by: FAMILY MEDICINE

## 2025-05-30 PROCEDURE — 99310 SBSQ NF CARE HIGH MDM 45: CPT | Performed by: NURSE PRACTITIONER

## 2025-05-30 PROCEDURE — 36415 COLL VENOUS BLD VENIPUNCTURE: CPT | Mod: ORL | Performed by: FAMILY MEDICINE

## 2025-05-30 NOTE — LETTER
5/30/2025      Modesta Tejeda  [address]  [facility] [address]        [facility] GERIATRIC SERVICES  Chief Complaint   Patient presents with     RECHECK     [facility] Medical Record Number:  [id number]  Place of Service where encounter took place:  [facility] () [[id number]]  Code Status:DNR    HISTORY:      HPI:  Modesta Tejeda  is 89 year old (1935) undergoing physical and occupational therapy. She is  with a history of aortic stenosis s/p aortic valve replacement, paroxysmal atrial fibrillation, hyperlipidemia, GERD, hypertension,Excerpted from records  who was admitted on 5/9/2025 with fall and periprosthetic fracture.   She does have a chronic wound on her right lower leg above the ankle that she follows with the wound clinic for and was last seen on 5/5/2025.    CT right lower extremity confirms the acute markedly displaced periprosthetic fracture of the distal femur as well as acute hematoma in the posterior aspect of the thigh.  CT cervical spine and CT head were both unremarkable.      Cardiology was consulted for pre-op assessment. TTE with improved EF 50% relative to prior in 2024; no aortic prosthetic stenosis or other concern. Started on low-dose metoprolol for HR control.     Patient underwent open reduction internal fixation right periprosthetic distal femur shaft 5/11/25. Intermittent anemia stabilized. Bridged with heparin and warfarin.        Today she is seen to review vital signs, reports of increased pain.  She admitted to the unit  following a closed fracture right distal femur and underwent an ORIF.  Today she reports having intermittent spasms with pain ranging from 7-10 out of 10.  Started on Robaxin 250 mg every 6 hours as needed.  Writer also noticed that the stool in her colostomy bag was not making its way down and sit in at the opening.  She started on MiraLAX 17 g daily along with her current scheduled senna S1 tab twice daily and to monitor bowel  status closely.  She denied chest pain shortness of breath cough congestion.   She is with a colostomy.  She is with a aortic valve and INR 2.5-3.5. Reports intermittent numbness and tingling right lower extremity however denies any calf pain.  She is also with a chronic wound right lower extremity and will have the wound care team follow her during her TCU stay.  Lisinopril currently on hold due to soft blood pressures. She is with a hinged knee brace.     ALLERGIES:Patient has no known allergies.    PAST MEDICAL HISTORY: No past medical history on file.    PAST SURGICAL HISTORY:   has no past surgical history on file.    FAMILY HISTORY: family history is not on file.    SOCIAL HISTORY:      ROS:  Constitutional: Negative for activity change, appetite change, fatigue and fever.  Nonweightbearing right lower extremity with knee immobilizer on at all times  HENT: Negative for congestion.    Respiratory: Negative for cough, shortness of breath and wheezing.    Cardiovascular: Negative for chest pain and leg swelling.   Gastrointestinal: Negative for abdominal distention, abdominal pain, constipation, diarrhea and nausea.  Colostomy  Genitourinary: Negative for dysuria.   Musculoskeletal: Negative for arthralgia. Negative for back pain.   Skin: Negative for color change and wound.  Surgical incision right femur, chronic wound right lower extremity  Neurological: Negative for dizziness.   Psychiatric/Behavioral: Negative for agitation, behavioral problems and confusion.     Physical Exam:  Constitutional:       Appearance: Patient is well-developed.   HENT:      Head: Normocephalic.   Eyes:      Conjunctiva/sclera: Conjunctivae normal.   Neck:      Musculoskeletal: Normal range of motion.   Cardiovascular:      Rate and Rhythm: Normal rate and regular rhythm.      Heart sounds: Normal heart sounds. No murmur.   Pulmonary:      Effort: No respiratory distress.      Breath sounds: Normal breath sounds. No wheezing or  rales.   Abdominal:      General: Bowel sounds are normal. There is no distension.      Palpations: Abdomen is soft.      Tenderness: There is no abdominal tenderness.   Musculoskeletal:       Normal range of motion.   Nonweightbearing right lower extremity  Skin:General:        Skin is warm.   Neurological:         Mental Status: Patient is alert and oriented to person, place, and time.   Psychiatric:         Behavior: Behavior normal.     Vitals:BP 96/50   Pulse 72   Temp 97.6  F (36.4  C)   Resp 16   Ht 1.524 m (5')   Wt 41.3 kg (91 lb)   SpO2 96%   BMI 17.77 kg/m   and Body mass index is 17.77 kg/m .    Lab/Diagnostic data:   Recent Results (from the past 240 hours)   CBC with platelets    Collection Time: 05/22/25  7:29 AM   Result Value Ref Range    WBC Count 9.2 4.0 - 11.0 10e3/uL    RBC Count 3.88 3.80 - 5.20 10e6/uL    Hemoglobin 11.6 (L) 11.7 - 15.7 g/dL    Hematocrit 37.7 35.0 - 47.0 %    MCV 97 78 - 100 fL    MCH 29.9 26.5 - 33.0 pg    MCHC 30.8 (L) 31.5 - 36.5 g/dL    RDW 15.4 (H) 10.0 - 15.0 %    Platelet Count 449 150 - 450 10e3/uL   Glucose (OUTREACH)    Collection Time: 05/22/25  7:29 AM   Result Value Ref Range    Glucose 94 70 - 99 mg/dL   Basic Metabolic Panel No Glucose (OUTREACH)    Collection Time: 05/22/25  7:29 AM   Result Value Ref Range    Sodium 138 135 - 145 mmol/L    Potassium 3.8 3.4 - 5.3 mmol/L    Chloride 99 98 - 107 mmol/L    Carbon Dioxide (CO2) 27 22 - 29 mmol/L    Anion Gap 12 7 - 15 mmol/L    Urea Nitrogen 18.9 8.0 - 23.0 mg/dL    Creatinine 0.57 0.51 - 0.95 mg/dL    GFR Estimate 87 >60 mL/min/1.73m2    Calcium 10.3 8.8 - 10.4 mg/dL   INR    Collection Time: 05/27/25  6:00 AM   Result Value Ref Range    INR 4.26 (H) 0.85 - 1.15    PT 40.5 (H) 11.8 - 14.8 Seconds   INR    Collection Time: 05/29/25  7:35 AM   Result Value Ref Range    INR 3.23 (H) 0.85 - 1.15    PT 32.1 (H) 11.8 - 14.8 Seconds   INR    Collection Time: 05/30/25  8:17 AM   Result Value Ref Range    INR 2.03  (H) 0.85 - 1.15    PT 23.1 (H) 11.8 - 14.8 Seconds        MEDICATIONS:  MED REC REQUIRED  Post Medication Reconciliation Status: discharge medications reconciled, continue medications without change          Review of your medicines            Accurate as of May 30, 2025 11:59 PM. If you have any questions, ask your nurse or doctor.                CONTINUE these medicines which have NOT CHANGED        Dose / Directions   acetaminophen 500 MG tablet  Commonly known as: TYLENOL      Dose: 1,000 mg  Take 1,000 mg by mouth 3 times daily.  Refills: 0     aspirin 81 MG EC tablet      Dose: 81 mg  Take 81 mg by mouth daily  Refills: 0     Calcium 500 + D 500-3.125 MG-MCG Tabs  Generic drug: Calcium Carb-Cholecalciferol      Dose: 1 tablet  Take 1 tablet by mouth daily  Refills: 0     famotidine 40 MG tablet  Commonly known as: PEPCID      Dose: 40 mg  Take 40 mg by mouth daily  Refills: 0     furosemide 20 MG tablet  Commonly known as: LASIX      Dose: 20 mg  Take 20 mg by mouth daily.  Refills: 0     lidocaine 4 % external cream  Commonly known as: LMX4      Apply topically once as needed for mild pain.  Refills: 0     methocarbamol 500 MG tablet  Commonly known as: ROBAXIN      Dose: 250 mg  Take 250 mg by mouth 4 times daily as needed for muscle spasms.  Refills: 0     metoprolol tartrate 25 MG tablet  Commonly known as: LOPRESSOR      Dose: 12.5 mg  Take 12.5 mg by mouth 2 times daily.  Refills: 0     nitroGLYcerin 0.4 MG sublingual tablet  Commonly known as: NITROSTAT      Dose: 0.4 mg  Place 0.4 mg under the tongue every 5 minutes as needed for chest pain For chest pain place 1 tablet under the tongue every 5 minutes for 3 doses. If symptoms persist 5 minutes after 1st dose call 911.  Refills: 0     oxyCODONE 5 MG tablet  Commonly known as: ROXICODONE  Used for: Pain      Dose: 5 mg  Take 1 tablet (5 mg) by mouth every 6 hours as needed for severe pain.  Quantity: 30 tablet  Refills: 0     polyethylene glycol 17 g  packet  Commonly known as: MIRALAX      Dose: 1 packet  Take 1 packet by mouth daily.  Refills: 0     potassium chloride 20 MEQ packet  Commonly known as: KLOR-CON      Dose: 40 mEq  Take 40 mEq by mouth daily.  Refills: 0     senna-docusate 8.6-50 MG tablet  Commonly known as: SENOKOT-S/PERICOLACE      Dose: 1-2 tablet  Take 1-2 tablets by mouth 2 times daily.  Refills: 0     simvastatin 10 MG tablet  Commonly known as: ZOCOR      Dose: 10 mg  Take 10 mg by mouth at bedtime  Refills: 0     WARFARIN SODIUM PO      3/4/24 INR 3.18  Hold Warfarin on 3/4 then take 4mg daily.  Check INR this week with PCP or home care.    2/26/24 INR 2.11  Cont 4.5mg daily.  Next INR 3/4/24.    2/21/24 INR 2.45  Take 4.5mg daily.  Next INR 2/26/24.    2/19/24 INR 4.43  Hold Warfarin x 2 days.  Next INR 2/21/24.    2/12/24 INR 2.46  Cont 6mg daily.  Next INR 2/19/24.    2/8/24 INR 1.64  Take 6mg daily.  Next INR 2/12/24.  Refills: 0              ASSESSMENT/PLAN  Encounter Diagnoses   Name Primary?     Pain management Yes     Constipation, unspecified constipation type      Muscle spasm      Muscle spasms started Robaxin 2050 mg every 6 hours as needed    Physical deconditioning PT OT    Closed fracture right distal femur S/P ORIF follow-up with orthopedics, pain management, nonweightbearing right lower extremity, knee immobilizer on at all times    Pain management scheduled extra strength Tylenol 3 times daily, lidocaine patch, as needed oxycodone    Atrial fibrillation continue Coumadin monitor and adjust per INRs Continue Metroprolol tartrate 12.5 mg twice daily    GERD on famotidine    HDL continue simvastatin    Constipation.  Patient with a colostomy currently on senna S1 tab twice daily, MiraLAX 17 g p.o. added today and to monitor bowel status closely she does report she will have a colostomy reversal done in the future.      Electronically signed by: Kelley Abbott CNP       Sincerely,        Kelley Abbott CNP    Electronically  signed

## 2025-05-31 RX ORDER — POLYETHYLENE GLYCOL 3350 17 G/17G
1 POWDER, FOR SOLUTION ORAL DAILY
COMMUNITY

## 2025-05-31 RX ORDER — METHOCARBAMOL 500 MG/1
250 TABLET, FILM COATED ORAL 4 TIMES DAILY PRN
COMMUNITY

## 2025-05-31 NOTE — PROGRESS NOTES
[facility] GERIATRIC SERVICES  Chief Complaint   Patient presents with    CHATO     [facility] [facility] Record Number:  [id number]  Place of Service where encounter took place:  [facility] ([facility]) [[id number]]  Code Status:DNR    HISTORY:      HPI:  Modesta Tejeda  is 89 year old (1935) undergoing physical and occupational therapy. She is  with a history of aortic stenosis s/p aortic valve replacement, paroxysmal atrial fibrillation, hyperlipidemia, GERD, hypertension,Excerpted from records  who was admitted on 5/9/2025 with fall and periprosthetic fracture.   She does have a chronic wound on her right lower leg above the ankle that she follows with the wound clinic for and was last seen on 5/5/2025.    CT right lower extremity confirms the acute markedly displaced periprosthetic fracture of the distal femur as well as acute hematoma in the posterior aspect of the thigh.  CT cervical spine and CT head were both unremarkable.      Cardiology was consulted for pre-op assessment. TTE with improved EF 50% relative to prior in 2024; no aortic prosthetic stenosis or other concern. Started on low-dose metoprolol for HR control.     Patient underwent open reduction internal fixation right periprosthetic distal femur shaft 5/11/25. Intermittent anemia stabilized. Bridged with heparin and warfarin.        Today she is seen to review vital signs, reports of increased pain.  She admitted to the unit  following a closed fracture right distal femur and underwent an ORIF.  Today she reports having intermittent spasms with pain ranging from 7-10 out of 10.  Started on Robaxin 250 mg every 6 hours as needed.  Writer also noticed that the stool in her colostomy bag was not making its way down and sit in at the opening.  She started on MiraLAX 17 g daily along with her current scheduled senna S1 tab twice daily and to monitor bowel status closely.  She denied chest pain shortness of breath cough congestion.   She is with a  colostomy.  She is with a aortic valve and INR 2.5-3.5. Reports intermittent numbness and tingling right lower extremity however denies any calf pain.  She is also with a chronic wound right lower extremity and will have the wound care team follow her during her TCU stay.  Lisinopril currently on hold due to soft blood pressures. She is with a hinged knee brace.     ALLERGIES:Patient has no known allergies.    PAST MEDICAL HISTORY: No past medical history on file.    PAST SURGICAL HISTORY:   has no past surgical history on file.    FAMILY HISTORY: family history is not on file.    SOCIAL HISTORY:      ROS:  Constitutional: Negative for activity change, appetite change, fatigue and fever.  Nonweightbearing right lower extremity with knee immobilizer on at all times  HENT: Negative for congestion.    Respiratory: Negative for cough, shortness of breath and wheezing.    Cardiovascular: Negative for chest pain and leg swelling.   Gastrointestinal: Negative for abdominal distention, abdominal pain, constipation, diarrhea and nausea.  Colostomy  Genitourinary: Negative for dysuria.   Musculoskeletal: Negative for arthralgia. Negative for back pain.   Skin: Negative for color change and wound.  Surgical incision right femur, chronic wound right lower extremity  Neurological: Negative for dizziness.   Psychiatric/Behavioral: Negative for agitation, behavioral problems and confusion.     Physical Exam:  Constitutional:       Appearance: Patient is well-developed.   HENT:      Head: Normocephalic.   Eyes:      Conjunctiva/sclera: Conjunctivae normal.   Neck:      Musculoskeletal: Normal range of motion.   Cardiovascular:      Rate and Rhythm: Normal rate and regular rhythm.      Heart sounds: Normal heart sounds. No murmur.   Pulmonary:      Effort: No respiratory distress.      Breath sounds: Normal breath sounds. No wheezing or rales.   Abdominal:      General: Bowel sounds are normal. There is no distension.       Palpations: Abdomen is soft.      Tenderness: There is no abdominal tenderness.   Musculoskeletal:       Normal range of motion.   Nonweightbearing right lower extremity  Skin:General:        Skin is warm.   Neurological:         Mental Status: Patient is alert and oriented to person, place, and time.   Psychiatric:         Behavior: Behavior normal.     Vitals:BP 96/50   Pulse 72   Temp 97.6  F (36.4  C)   Resp 16   Ht 1.524 m (5')   Wt 41.3 kg (91 lb)   SpO2 96%   BMI 17.77 kg/m   and Body mass index is 17.77 kg/m .    Lab/Diagnostic data:   Recent Results (from the past 240 hours)   CBC with platelets    Collection Time: 05/22/25  7:29 AM   Result Value Ref Range    WBC Count 9.2 4.0 - 11.0 10e3/uL    RBC Count 3.88 3.80 - 5.20 10e6/uL    Hemoglobin 11.6 (L) 11.7 - 15.7 g/dL    Hematocrit 37.7 35.0 - 47.0 %    MCV 97 78 - 100 fL    MCH 29.9 26.5 - 33.0 pg    MCHC 30.8 (L) 31.5 - 36.5 g/dL    RDW 15.4 (H) 10.0 - 15.0 %    Platelet Count 449 150 - 450 10e3/uL   Glucose (OUTREACH)    Collection Time: 05/22/25  7:29 AM   Result Value Ref Range    Glucose 94 70 - 99 mg/dL   Basic Metabolic Panel No Glucose (OUTREACH)    Collection Time: 05/22/25  7:29 AM   Result Value Ref Range    Sodium 138 135 - 145 mmol/L    Potassium 3.8 3.4 - 5.3 mmol/L    Chloride 99 98 - 107 mmol/L    Carbon Dioxide (CO2) 27 22 - 29 mmol/L    Anion Gap 12 7 - 15 mmol/L    Urea Nitrogen 18.9 8.0 - 23.0 mg/dL    Creatinine 0.57 0.51 - 0.95 mg/dL    GFR Estimate 87 >60 mL/min/1.73m2    Calcium 10.3 8.8 - 10.4 mg/dL   INR    Collection Time: 05/27/25  6:00 AM   Result Value Ref Range    INR 4.26 (H) 0.85 - 1.15    PT 40.5 (H) 11.8 - 14.8 Seconds   INR    Collection Time: 05/29/25  7:35 AM   Result Value Ref Range    INR 3.23 (H) 0.85 - 1.15    PT 32.1 (H) 11.8 - 14.8 Seconds   INR    Collection Time: 05/30/25  8:17 AM   Result Value Ref Range    INR 2.03 (H) 0.85 - 1.15    PT 23.1 (H) 11.8 - 14.8 Seconds        MEDICATIONS:  MED REC  REQUIRED  Post Medication Reconciliation Status: discharge medications reconciled, continue medications without change          Review of your medicines            Accurate as of May 30, 2025 11:59 PM. If you have any questions, ask your nurse or doctor.                CONTINUE these medicines which have NOT CHANGED        Dose / Directions   acetaminophen 500 MG tablet  Commonly known as: TYLENOL      Dose: 1,000 mg  Take 1,000 mg by mouth 3 times daily.  Refills: 0     aspirin 81 MG EC tablet      Dose: 81 mg  Take 81 mg by mouth daily  Refills: 0     Calcium 500 + D 500-3.125 MG-MCG Tabs  Generic drug: Calcium Carb-Cholecalciferol      Dose: 1 tablet  Take 1 tablet by mouth daily  Refills: 0     famotidine 40 MG tablet  Commonly known as: PEPCID      Dose: 40 mg  Take 40 mg by mouth daily  Refills: 0     furosemide 20 MG tablet  Commonly known as: LASIX      Dose: 20 mg  Take 20 mg by mouth daily.  Refills: 0     lidocaine 4 % external cream  Commonly known as: LMX4      Apply topically once as needed for mild pain.  Refills: 0     methocarbamol 500 MG tablet  Commonly known as: ROBAXIN      Dose: 250 mg  Take 250 mg by mouth 4 times daily as needed for muscle spasms.  Refills: 0     metoprolol tartrate 25 MG tablet  Commonly known as: LOPRESSOR      Dose: 12.5 mg  Take 12.5 mg by mouth 2 times daily.  Refills: 0     nitroGLYcerin 0.4 MG sublingual tablet  Commonly known as: NITROSTAT      Dose: 0.4 mg  Place 0.4 mg under the tongue every 5 minutes as needed for chest pain For chest pain place 1 tablet under the tongue every 5 minutes for 3 doses. If symptoms persist 5 minutes after 1st dose call 911.  Refills: 0     oxyCODONE 5 MG tablet  Commonly known as: ROXICODONE  Used for: Pain      Dose: 5 mg  Take 1 tablet (5 mg) by mouth every 6 hours as needed for severe pain.  Quantity: 30 tablet  Refills: 0     polyethylene glycol 17 g packet  Commonly known as: MIRALAX      Dose: 1 packet  Take 1 packet by mouth  daily.  Refills: 0     potassium chloride 20 MEQ packet  Commonly known as: KLOR-CON      Dose: 40 mEq  Take 40 mEq by mouth daily.  Refills: 0     senna-docusate 8.6-50 MG tablet  Commonly known as: SENOKOT-S/PERICOLACE      Dose: 1-2 tablet  Take 1-2 tablets by mouth 2 times daily.  Refills: 0     simvastatin 10 MG tablet  Commonly known as: ZOCOR      Dose: 10 mg  Take 10 mg by mouth at bedtime  Refills: 0     WARFARIN SODIUM PO      3/4/24 INR 3.18  Hold Warfarin on 3/4 then take 4mg daily.  Check INR this week with PCP or home care.    2/26/24 INR 2.11  Cont 4.5mg daily.  Next INR 3/4/24.    2/21/24 INR 2.45  Take 4.5mg daily.  Next INR 2/26/24.    2/19/24 INR 4.43  Hold Warfarin x 2 days.  Next INR 2/21/24.    2/12/24 INR 2.46  Cont 6mg daily.  Next INR 2/19/24.    2/8/24 INR 1.64  Take 6mg daily.  Next INR 2/12/24.  Refills: 0              ASSESSMENT/PLAN  Encounter Diagnoses   Name Primary?    Pain management Yes    Constipation, unspecified constipation type     Muscle spasm      Muscle spasms started Robaxin 2050 mg every 6 hours as needed    Physical deconditioning PT OT    Closed fracture right distal femur S/P ORIF follow-up with orthopedics, pain management, nonweightbearing right lower extremity, knee immobilizer on at all times    Pain management scheduled extra strength Tylenol 3 times daily, lidocaine patch, as needed oxycodone    Atrial fibrillation continue Coumadin monitor and adjust per INRs Continue Metroprolol tartrate 12.5 mg twice daily    GERD on famotidine    HDL continue simvastatin    Constipation.  Patient with a colostomy currently on senna S1 tab twice daily, MiraLAX 17 g p.o. added today and to monitor bowel status closely she does report she will have a colostomy reversal done in the future.      Electronically signed by: Kelley Abbott CNP

## 2025-06-02 ENCOUNTER — LAB REQUISITION (OUTPATIENT)
Dept: LAB | Facility: CLINIC | Age: 89
End: 2025-06-02
Payer: COMMERCIAL

## 2025-06-02 DIAGNOSIS — I48.91 UNSPECIFIED ATRIAL FIBRILLATION (H): ICD-10-CM

## 2025-06-03 ENCOUNTER — RESULTS FOLLOW-UP (OUTPATIENT)
Dept: GERIATRICS | Facility: CLINIC | Age: 89
End: 2025-06-03

## 2025-06-03 LAB
INR PPP: 2.37 (ref 0.85–1.15)
PROTHROMBIN TIME: 25.4 SECONDS (ref 11.8–14.8)

## 2025-06-04 PROBLEM — S72.401D CLOSED FRACTURE OF DISTAL END OF RIGHT FEMUR WITH ROUTINE HEALING, UNSPECIFIED FRACTURE MORPHOLOGY, SUBSEQUENT ENCOUNTER: Status: ACTIVE | Noted: 2025-06-04

## 2025-06-04 NOTE — PROGRESS NOTES
[facility] GERIATRIC SERVICES  Chief Complaint   Patient presents with    CHATO     [place] Medical Record Number:  [id number]  Place of Service where encounter took place:  [facility] ([facility] ([facility]) [[id number]]  Code Status:DNR    HISTORY:      HPI:  Modesta Tejeda  is 89 year old (1935) undergoing physical and occupational therapy. She is  with a history of aortic stenosis s/p aortic valve replacement, paroxysmal atrial fibrillation, hyperlipidemia, GERD, hypertension,Excerpted from records  who was admitted on 5/9/2025 with fall and periprosthetic fracture.   She does have a chronic wound on her right lower leg above the ankle that she follows with the wound clinic for and was last seen on 5/5/2025.    CT right lower extremity confirms the acute markedly displaced periprosthetic fracture of the distal femur as well as acute hematoma in the posterior aspect of the thigh.  CT cervical spine and CT head were both unremarkable.      Cardiology was consulted for pre-op assessment. TTE with improved EF 50% relative to prior in 2024; no aortic prosthetic stenosis or other concern. Started on low-dose metoprolol for HR control.     Patient underwent open reduction internal fixation right periprosthetic distal femur shaft 5/11/25. Intermittent anemia stabilized. Bridged with heparin and warfarin.        Today she is seen to review vital signs,routine visit and a face to face for a wheelchair and hospital bed.  Orders signed in parachute.  She admitted to the unit  following a closed fracture right distal femur and underwent an ORIF.  Her colostomy is functioning fine today since adding Miralax.  She denied chest pain shortness of breath cough congestion.  She is with a aortic valve and INR 2.5-3.5. Reports intermittent numbness and tingling right lower extremity however denies any calf pain.  She is also with a chronic wound right lower extremity and will have the wound care team follow her during her TCU stay.   Lisinopril currently on hold due to soft blood pressures. She is with a hinged knee brace. Weights were labile showing a 9 pound weight gain in a day and she  was re-weighed which was back to her baseline. Today she reports something was not deducted. Next INR 6/10/25.    ALLERGIES:Patient has no known allergies.    PAST MEDICAL HISTORY: No past medical history on file.    PAST SURGICAL HISTORY:   has no past surgical history on file.    FAMILY HISTORY: family history is not on file.    SOCIAL HISTORY:      ROS:  Constitutional: Negative for activity change, appetite change, fatigue and fever.  Nonweightbearing right lower extremity with knee immobilizer on at all times  HENT: Negative for congestion.    Respiratory: Negative for cough, shortness of breath and wheezing.    Cardiovascular: Negative for chest pain and leg swelling.   Gastrointestinal: Negative for abdominal distention, abdominal pain, constipation, diarrhea and nausea.  Colostomy  Genitourinary: Negative for dysuria.   Musculoskeletal: Negative for arthralgia. Negative for back pain.   Skin: Negative for color change and wound.  Surgical incision right femur, chronic wound right lower extremity  Neurological: Negative for dizziness.   Psychiatric/Behavioral: Negative for agitation, behavioral problems and confusion.     Physical Exam:  Constitutional:       Appearance: Patient is well-developed.   HENT:      Head: Normocephalic.   Eyes:      Conjunctiva/sclera: Conjunctivae normal.   Neck:      Musculoskeletal: Normal range of motion.   Cardiovascular:      Rate and Rhythm: Normal rate and regular rhythm.      Heart sounds: Normal heart sounds. No murmur.   Pulmonary:      Effort: No respiratory distress.      Breath sounds: Normal breath sounds. No wheezing or rales.   Abdominal:      General: Bowel sounds are normal. There is no distension.      Palpations: Abdomen is soft.      Tenderness: There is no abdominal tenderness.   Musculoskeletal:        Normal range of motion.   Nonweightbearing right lower extremity  Skin:General:        Skin is warm.   Neurological:         Mental Status: Patient is alert and oriented to person, place, and time.   Psychiatric:         Behavior: Behavior normal.     Vitals:/55   Pulse 71   Temp (!) 96.6  F (35.9  C)   Resp 16   Ht 1.524 m (5')   Wt 45.2 kg (99 lb 9.6 oz)   SpO2 98%   BMI 19.45 kg/m   and Body mass index is 19.45 kg/m .    Lab/Diagnostic data:   Recent Results (from the past 240 hours)   INR    Collection Time: 05/27/25  6:00 AM   Result Value Ref Range    INR 4.26 (H) 0.85 - 1.15    PT 40.5 (H) 11.8 - 14.8 Seconds   INR    Collection Time: 05/29/25  7:35 AM   Result Value Ref Range    INR 3.23 (H) 0.85 - 1.15    PT 32.1 (H) 11.8 - 14.8 Seconds   INR    Collection Time: 05/30/25  8:17 AM   Result Value Ref Range    INR 2.03 (H) 0.85 - 1.15    PT 23.1 (H) 11.8 - 14.8 Seconds   INR    Collection Time: 06/03/25  7:09 AM   Result Value Ref Range    INR 2.37 (H) 0.85 - 1.15    PT 25.4 (H) 11.8 - 14.8 Seconds        MEDICATIONS:  MED REC REQUIRED  Post Medication Reconciliation Status: discharge medications reconciled, continue medications without change          Review of your medicines            Accurate as of June 5, 2025 10:30 AM. If you have any questions, ask your nurse or doctor.                CONTINUE these medicines which may have CHANGED, or have new prescriptions. If we are uncertain of the size of tablets/capsules you have at home, strength may be listed as something that might have changed.        Dose / Directions   WARFARIN SODIUM PO  This may have changed:   how much to take  when to take this  additional instructions      3/4/24 INR 3.18  Hold Warfarin on 3/4 then take 4mg daily.  Check INR this week with PCP or home care.    2/26/24 INR 2.11  Cont 4.5mg daily.  Next INR 3/4/24.    2/21/24 INR 2.45  Take 4.5mg daily.  Next INR 2/26/24.    2/19/24 INR 4.43  Hold Warfarin x 2 days.  Next INR  2/21/24.    2/12/24 INR 2.46  Cont 6mg daily.  Next INR 2/19/24.    2/8/24 INR 1.64  Take 6mg daily.  Next INR 2/12/24.  Refills: 0            CONTINUE these medicines which have NOT CHANGED        Dose / Directions   acetaminophen 500 MG tablet  Commonly known as: TYLENOL      Dose: 1,000 mg  Take 1,000 mg by mouth 3 times daily.  Refills: 0     aspirin 81 MG EC tablet      Dose: 81 mg  Take 81 mg by mouth daily  Refills: 0     Calcium 500 + D 500-3.125 MG-MCG Tabs  Generic drug: Calcium Carb-Cholecalciferol      Dose: 1 tablet  Take 1 tablet by mouth daily  Refills: 0     famotidine 40 MG tablet  Commonly known as: PEPCID      Dose: 40 mg  Take 40 mg by mouth daily  Refills: 0     furosemide 20 MG tablet  Commonly known as: LASIX      Dose: 20 mg  Take 20 mg by mouth daily.  Refills: 0     lidocaine 4 % external cream  Commonly known as: LMX4      Apply topically once as needed for mild pain.  Refills: 0     methocarbamol 500 MG tablet  Commonly known as: ROBAXIN      Dose: 250 mg  Take 250 mg by mouth 4 times daily as needed for muscle spasms.  Refills: 0     metoprolol tartrate 25 MG tablet  Commonly known as: LOPRESSOR      Dose: 12.5 mg  Take 12.5 mg by mouth 2 times daily.  Refills: 0     nitroGLYcerin 0.4 MG sublingual tablet  Commonly known as: NITROSTAT      Dose: 0.4 mg  Place 0.4 mg under the tongue every 5 minutes as needed for chest pain For chest pain place 1 tablet under the tongue every 5 minutes for 3 doses. If symptoms persist 5 minutes after 1st dose call 911.  Refills: 0     oxyCODONE 5 MG tablet  Commonly known as: ROXICODONE  Used for: Pain      Dose: 5 mg  Take 1 tablet (5 mg) by mouth every 6 hours as needed for severe pain.  Quantity: 30 tablet  Refills: 0     polyethylene glycol 17 g packet  Commonly known as: MIRALAX      Dose: 1 packet  Take 1 packet by mouth daily.  Refills: 0     potassium chloride 20 MEQ packet  Commonly known as: KLOR-CON      Dose: 40 mEq  Take 40 mEq by mouth  daily.  Refills: 0     senna-docusate 8.6-50 MG tablet  Commonly known as: SENOKOT-S/PERICOLACE      Dose: 1-2 tablet  Take 1-2 tablets by mouth 2 times daily.  Refills: 0     simvastatin 10 MG tablet  Commonly known as: ZOCOR      Dose: 10 mg  Take 10 mg by mouth at bedtime  Refills: 0              ASSESSMENT/PLAN  Encounter Diagnoses   Name Primary?    Pain management Yes    Physical deconditioning     Colostomy present (H)     Closed fracture of distal end of right femur with routine healing, unspecified fracture morphology, subsequent encounter        Muscle spasms  Robaxin 250mg every 6 hours as needed    Physical deconditioning PT OT    Closed fracture right distal femur S/P ORIF follow-up with orthopedics, pain management, nonweightbearing right lower extremity, knee immobilizer on at all times    Pain management scheduled extra strength Tylenol 3 times daily, lidocaine patch, as needed oxycodone    Atrial fibrillation continue Coumadin monitor and adjust per INRs Continue Metroprolol tartrate 12.5 mg twice daily    GERD on famotidine    HDL continue simvastatin    Constipation.  Patient with a colostomy currently on senna S1 tab twice daily, MiraLAX 17 g p.o. added  and now RESOLVED      Electronically signed by: Kelley Abbott CNP

## 2025-06-05 ENCOUNTER — TRANSITIONAL CARE UNIT VISIT (OUTPATIENT)
Dept: GERIATRICS | Facility: CLINIC | Age: 89
End: 2025-06-05
Payer: COMMERCIAL

## 2025-06-05 VITALS
WEIGHT: 99.6 LBS | HEIGHT: 60 IN | TEMPERATURE: 96.6 F | BODY MASS INDEX: 19.56 KG/M2 | HEART RATE: 71 BPM | RESPIRATION RATE: 16 BRPM | OXYGEN SATURATION: 98 % | DIASTOLIC BLOOD PRESSURE: 55 MMHG | SYSTOLIC BLOOD PRESSURE: 104 MMHG

## 2025-06-05 DIAGNOSIS — R52 PAIN MANAGEMENT: Primary | ICD-10-CM

## 2025-06-05 DIAGNOSIS — Z93.3 COLOSTOMY PRESENT (H): ICD-10-CM

## 2025-06-05 DIAGNOSIS — S72.401D CLOSED FRACTURE OF DISTAL END OF RIGHT FEMUR WITH ROUTINE HEALING, UNSPECIFIED FRACTURE MORPHOLOGY, SUBSEQUENT ENCOUNTER: ICD-10-CM

## 2025-06-05 DIAGNOSIS — R53.81 PHYSICAL DECONDITIONING: ICD-10-CM

## 2025-06-05 NOTE — LETTER
6/5/2025      Modesta Tejeda  [address]  [facility] [address]        [facility] GERIATRIC SERVICES  Chief Complaint   Patient presents with     [facility] Medical Record Number:  [id number]  Place of Service where encounter took place:  [facility] ([facility]) [[id number]]  Code Status:DNR    HISTORY:      HPI:  Modesta Tejeda  is 89 year old (1935) undergoing physical and occupational therapy. She is  with a history of aortic stenosis s/p aortic valve replacement, paroxysmal atrial fibrillation, hyperlipidemia, GERD, hypertension,Excerpted from records  who was admitted on 5/9/2025 with fall and periprosthetic fracture.   She does have a chronic wound on her right lower leg above the ankle that she follows with the wound clinic for and was last seen on 5/5/2025.    CT right lower extremity confirms the acute markedly displaced periprosthetic fracture of the distal femur as well as acute hematoma in the posterior aspect of the thigh.  CT cervical spine and CT head were both unremarkable.      Cardiology was consulted for pre-op assessment. TTE with improved EF 50% relative to prior in 2024; no aortic prosthetic stenosis or other concern. Started on low-dose metoprolol for HR control.     Patient underwent open reduction internal fixation right periprosthetic distal femur shaft 5/11/25. Intermittent anemia stabilized. Bridged with heparin and warfarin.        Today she is seen to review vital signs,routine visit and a face to face for a wheelchair and hospital bed.  Orders signed in parachute.  She admitted to the unit  following a closed fracture right distal femur and underwent an ORIF.  Her colostomy is functioning fine today since adding Miralax.  She denied chest pain shortness of breath cough congestion.  She is with a aortic valve and INR 2.5-3.5. Reports intermittent numbness and tingling right lower extremity however denies any calf pain.  She is also with a chronic  wound right lower extremity and will have the wound care team follow her during her TCU stay.  Lisinopril currently on hold due to soft blood pressures. She is with a hinged knee brace. Weights were labile showing a 9 pound weight gain in a day and she  was re-weighed which was back to her baseline. Today she reports something was not deducted. Next INR 6/10/25.    ALLERGIES:Patient has no known allergies.    PAST MEDICAL HISTORY: No past medical history on file.    PAST SURGICAL HISTORY:   has no past surgical history on file.    FAMILY HISTORY: family history is not on file.    SOCIAL HISTORY:      ROS:  Constitutional: Negative for activity change, appetite change, fatigue and fever.  Nonweightbearing right lower extremity with knee immobilizer on at all times  HENT: Negative for congestion.    Respiratory: Negative for cough, shortness of breath and wheezing.    Cardiovascular: Negative for chest pain and leg swelling.   Gastrointestinal: Negative for abdominal distention, abdominal pain, constipation, diarrhea and nausea.  Colostomy  Genitourinary: Negative for dysuria.   Musculoskeletal: Negative for arthralgia. Negative for back pain.   Skin: Negative for color change and wound.  Surgical incision right femur, chronic wound right lower extremity  Neurological: Negative for dizziness.   Psychiatric/Behavioral: Negative for agitation, behavioral problems and confusion.     Physical Exam:  Constitutional:       Appearance: Patient is well-developed.   HENT:      Head: Normocephalic.   Eyes:      Conjunctiva/sclera: Conjunctivae normal.   Neck:      Musculoskeletal: Normal range of motion.   Cardiovascular:      Rate and Rhythm: Normal rate and regular rhythm.      Heart sounds: Normal heart sounds. No murmur.   Pulmonary:      Effort: No respiratory distress.      Breath sounds: Normal breath sounds. No wheezing or rales.   Abdominal:      General: Bowel sounds are normal. There is no distension.       Palpations: Abdomen is soft.      Tenderness: There is no abdominal tenderness.   Musculoskeletal:       Normal range of motion.   Nonweightbearing right lower extremity  Skin:General:        Skin is warm.   Neurological:         Mental Status: Patient is alert and oriented to person, place, and time.   Psychiatric:         Behavior: Behavior normal.     Vitals:/55   Pulse 71   Temp (!) 96.6  F (35.9  C)   Resp 16   Ht 1.524 m (5')   Wt 45.2 kg (99 lb 9.6 oz)   SpO2 98%   BMI 19.45 kg/m   and Body mass index is 19.45 kg/m .    Lab/Diagnostic data:   Recent Results (from the past 240 hours)   INR    Collection Time: 05/27/25  6:00 AM   Result Value Ref Range    INR 4.26 (H) 0.85 - 1.15    PT 40.5 (H) 11.8 - 14.8 Seconds   INR    Collection Time: 05/29/25  7:35 AM   Result Value Ref Range    INR 3.23 (H) 0.85 - 1.15    PT 32.1 (H) 11.8 - 14.8 Seconds   INR    Collection Time: 05/30/25  8:17 AM   Result Value Ref Range    INR 2.03 (H) 0.85 - 1.15    PT 23.1 (H) 11.8 - 14.8 Seconds   INR    Collection Time: 06/03/25  7:09 AM   Result Value Ref Range    INR 2.37 (H) 0.85 - 1.15    PT 25.4 (H) 11.8 - 14.8 Seconds        MEDICATIONS:  MED REC REQUIRED  Post Medication Reconciliation Status: discharge medications reconciled, continue medications without change          Review of your medicines            Accurate as of June 5, 2025 10:30 AM. If you have any questions, ask your nurse or doctor.                CONTINUE these medicines which may have CHANGED, or have new prescriptions. If we are uncertain of the size of tablets/capsules you have at home, strength may be listed as something that might have changed.        Dose / Directions   WARFARIN SODIUM PO  This may have changed:   how much to take  when to take this  additional instructions      3/4/24 INR 3.18  Hold Warfarin on 3/4 then take 4mg daily.  Check INR this week with PCP or home care.    2/26/24 INR 2.11  Cont 4.5mg daily.  Next INR 3/4/24.     2/21/24 INR 2.45  Take 4.5mg daily.  Next INR 2/26/24.    2/19/24 INR 4.43  Hold Warfarin x 2 days.  Next INR 2/21/24.    2/12/24 INR 2.46  Cont 6mg daily.  Next INR 2/19/24.    2/8/24 INR 1.64  Take 6mg daily.  Next INR 2/12/24.  Refills: 0            CONTINUE these medicines which have NOT CHANGED        Dose / Directions   acetaminophen 500 MG tablet  Commonly known as: TYLENOL      Dose: 1,000 mg  Take 1,000 mg by mouth 3 times daily.  Refills: 0     aspirin 81 MG EC tablet      Dose: 81 mg  Take 81 mg by mouth daily  Refills: 0     Calcium 500 + D 500-3.125 MG-MCG Tabs  Generic drug: Calcium Carb-Cholecalciferol      Dose: 1 tablet  Take 1 tablet by mouth daily  Refills: 0     famotidine 40 MG tablet  Commonly known as: PEPCID      Dose: 40 mg  Take 40 mg by mouth daily  Refills: 0     furosemide 20 MG tablet  Commonly known as: LASIX      Dose: 20 mg  Take 20 mg by mouth daily.  Refills: 0     lidocaine 4 % external cream  Commonly known as: LMX4      Apply topically once as needed for mild pain.  Refills: 0     methocarbamol 500 MG tablet  Commonly known as: ROBAXIN      Dose: 250 mg  Take 250 mg by mouth 4 times daily as needed for muscle spasms.  Refills: 0     metoprolol tartrate 25 MG tablet  Commonly known as: LOPRESSOR      Dose: 12.5 mg  Take 12.5 mg by mouth 2 times daily.  Refills: 0     nitroGLYcerin 0.4 MG sublingual tablet  Commonly known as: NITROSTAT      Dose: 0.4 mg  Place 0.4 mg under the tongue every 5 minutes as needed for chest pain For chest pain place 1 tablet under the tongue every 5 minutes for 3 doses. If symptoms persist 5 minutes after 1st dose call 911.  Refills: 0     oxyCODONE 5 MG tablet  Commonly known as: ROXICODONE  Used for: Pain      Dose: 5 mg  Take 1 tablet (5 mg) by mouth every 6 hours as needed for severe pain.  Quantity: 30 tablet  Refills: 0     polyethylene glycol 17 g packet  Commonly known as: MIRALAX      Dose: 1 packet  Take 1 packet by mouth daily.  Refills:  0     potassium chloride 20 MEQ packet  Commonly known as: KLOR-CON      Dose: 40 mEq  Take 40 mEq by mouth daily.  Refills: 0     senna-docusate 8.6-50 MG tablet  Commonly known as: SENOKOT-S/PERICOLACE      Dose: 1-2 tablet  Take 1-2 tablets by mouth 2 times daily.  Refills: 0     simvastatin 10 MG tablet  Commonly known as: ZOCOR      Dose: 10 mg  Take 10 mg by mouth at bedtime  Refills: 0              ASSESSMENT/PLAN  Encounter Diagnoses   Name Primary?     Pain management Yes     Physical deconditioning      Colostomy present (H)      Closed fracture of distal end of right femur with routine healing, unspecified fracture morphology, subsequent encounter        Muscle spasms  Robaxin 250mg every 6 hours as needed    Physical deconditioning PT OT    Closed fracture right distal femur S/P ORIF follow-up with orthopedics, pain management, nonweightbearing right lower extremity, knee immobilizer on at all times    Pain management scheduled extra strength Tylenol 3 times daily, lidocaine patch, as needed oxycodone    Atrial fibrillation continue Coumadin monitor and adjust per INRs Continue Metroprolol tartrate 12.5 mg twice daily    GERD on famotidine    HDL continue simvastatin    Constipation.  Patient with a colostomy currently on senna S1 tab twice daily, MiraLAX 17 g p.o. added  and now RESOLVED      Electronically signed by: Kelley Abbott CNP       Sincerely,        Kelley Abbott CNP    Electronically signed

## 2025-06-09 ENCOUNTER — LAB REQUISITION (OUTPATIENT)
Dept: LAB | Facility: CLINIC | Age: 89
End: 2025-06-09
Payer: COMMERCIAL

## 2025-06-09 DIAGNOSIS — Z79.01 LONG TERM (CURRENT) USE OF ANTICOAGULANTS: ICD-10-CM

## 2025-06-09 NOTE — PROGRESS NOTES
[facility] GERIATRIC SERVICES       Patient Modesta Tejeda  MRN: [id number]        Reason for Visit     Chief Complaint   Patient presents with    Follow Up     INITIAL       Code Status     DNR only    Assessment/plan     Right distal femur periprosthetic fracture status post ORIF on 5/11/2025  Discharge nonweightbearing right lower extremity with knee immobilizer  Follow-up with ortho done and will continue to be nonweightbearing till seen further  Needs a roxanna    Traumatic hematoma of the right lower extremity   she has a chronic right lower extremity venous ulcer  Continue with the wound cares    ABLA requiring 3 units of packed RBCs  R/c Hg    GERD currently on  Pepcid    Paroxysmal A-fib  Started on metoprolol 12.5 twice daily  On warfarin for anticoagulation-INR is therapeutic at 2.6 today    Hyperlipidemia on simvastatin    Prior history of AVR with mechanical aortic valve on warfarin    Congestive heart failure with reduced ejection fraction with last echocardiogram showing EF of 35 to 40%.    Continue Lasix.  No edema noted on exam    Hypertension with frequent hypotension with low blood pressures taken off lisinopril in the hospital  Blood pressures are frequently low   nursing has been holding her metoprolol quite often    History of a prior colectomy with a colostomy    Generalized weakness discharged to the TCU for strengthening and rehab.  Continues to be quite debilitated needs a Roxanna for transfers    Pain is also rated as high and using oxycodone quite often  Last labs reviewed and essentially stable.      History     Patient is a very pleasant 89 year old female who is admitted to TCU.   patient was admitted post fall.  Noted to have a periprosthetic femur fracture.  Orthopedic consulted -she underwent ORIF on 5/11/2025  .  She is discharged nonweightbearing on the right lower extremity to the TCU.  She needs a Roxanna rating her pain is high      Past Medical & Surgical History     Normocytic  anemia  Paroxysmal atrial fibrillation  Status post AVR  Osteoporosis     Past Social History     Reviewed,  she reports she lives alone and is   No current history of smoking or alcohol use    Family History     Reviewed, and family history is not on file.    Medication List     Current Outpatient Medications   Medication Sig Dispense Refill    acetaminophen (TYLENOL) 500 MG tablet Take 1,000 mg by mouth 3 times daily.      aspirin 81 MG EC tablet Take 81 mg by mouth daily      Calcium Carb-Cholecalciferol (CALCIUM 500 + D) 500-3.125 MG-MCG TABS Take 1 tablet by mouth daily      famotidine (PEPCID) 40 MG tablet Take 40 mg by mouth daily      furosemide (LASIX) 20 MG tablet Take 20 mg by mouth daily.      lidocaine (LMX4) 4 % external cream Apply topically once as needed for mild pain.      methocarbamol (ROBAXIN) 500 MG tablet Take 250 mg by mouth 4 times daily as needed for muscle spasms.      metoprolol tartrate (LOPRESSOR) 25 MG tablet Take 12.5 mg by mouth 2 times daily.      nitroGLYcerin (NITROSTAT) 0.4 MG sublingual tablet Place 0.4 mg under the tongue every 5 minutes as needed for chest pain For chest pain place 1 tablet under the tongue every 5 minutes for 3 doses. If symptoms persist 5 minutes after 1st dose call 911.      oxyCODONE (ROXICODONE) 5 MG tablet Take 1 tablet (5 mg) by mouth every 6 hours as needed for severe pain. 30 tablet 0    polyethylene glycol (MIRALAX) 17 g packet Take 1 packet by mouth daily.      potassium chloride (KLOR-CON) 20 MEQ packet Take 40 mEq by mouth daily.      senna-docusate (SENOKOT-S/PERICOLACE) 8.6-50 MG tablet Take 1-2 tablets by mouth 2 times daily.      simvastatin (ZOCOR) 10 MG tablet Take 10 mg by mouth at bedtime      WARFARIN SODIUM PO 3/4/24 INR 3.18  Hold Warfarin on 3/4 then take 4mg daily.  Check INR this week with PCP or home care.    2/26/24 INR 2.11  Cont 4.5mg daily.  Next INR 3/4/24.    2/21/24 INR 2.45  Take 4.5mg daily.  Next INR 2/26/24.     2/19/24 INR 4.43  Hold Warfarin x 2 days.  Next INR 2/21/24.    2/12/24 INR 2.46  Cont 6mg daily.  Next INR 2/19/24.    2/8/24 INR 1.64  Take 6mg daily.  Next INR 2/12/24. (Patient taking differently: Take 6 mg by mouth daily. Next INR 6/10/25)       No current facility-administered medications for this visit.      MED REC REQUIRED  Post Medication Reconciliation Status:        Allergies     No Known Allergies    Review of Systems   A comprehensive review of 14 systems was done. Pertinent findings noted here and in history of present illness. All the rest negative.  Constitutional: Negative.  Negative for fever, chills, she has  activity change, appetite change and fatigue.   HENT: Negative for congestion and facial swelling.    Eyes: Negative for photophobia, redness and visual disturbance.   Respiratory: Negative for cough and chest tightness.    Cardiovascular: Negative for chest pain, palpitations and leg swelling.   Gastrointestinal: Negative for nausea, diarrhea, constipation, blood in stool and abdominal distention.   Genitourinary: Negative.    Musculoskeletal: Negative.  Cannot ambulate due to nonweightbearing status pain is high and using oxycodone nonhealing wound with a hematoma on the right leg noted  Skin: Negative.    Neurological: Negative for dizziness, tremors, syncope, weakness, light-headedness and headaches.   Hematological: Does not bruise/bleed easily.   Psychiatric/Behavioral: Negative.        Physical Exam   /46   Pulse 71   Temp 97.1  F (36.2  C)   Resp 18   Ht 1.524 m (5')   Wt 41.7 kg (92 lb)   SpO2 95%   BMI 17.97 kg/m       Constitutional: Oriented to person, place, and time and appears well-developed.   HEENT:  Normocephalic and atraumatic.  Eyes: Conjunctivae and EOM are normal. Pupils are equal, round, and reactive to light. No discharge.  No scleral icterus. Nose normal. Mouth/Throat: Oropharynx is clear and moist. No oropharyngeal exudate.    NECK: Normal range of  motion. Neck supple. No JVD present. No tracheal deviation present. No thyromegaly present.   CARDIOVASCULAR: Normal rate, regular rhythm and intact distal pulses.  Exam reveals no gallop and no friction rub.  Systolic murmur present.  PULMONARY: Effort normal and breath sounds normal. No respiratory distress.No Wheezing or rales.  ABDOMEN: Soft. Bowel sounds are normal. No distension and no mass.  There is no tenderness. There is no rebound and no guarding. No HSM.  She has a colostomy left lower quadrant  MUSCULOSKELETAL: Normal range of motion. Mild kyphosis, no tenderness.  Right lower extremity is in a knee immobilizer with an intact dressing  Wound on the right shin  LYMPH NODES: Has no cervical, supraclavicular, axillary and groin adenopathy.   NEUROLOGICAL: Alert and oriented to person, place, and time. No cranial nerve deficit.  Normal muscle tone. Coordination normal.   GENITOURINARY: Deferred exam.  SKIN: Skin is warm and dry. No rash noted. No erythema. No pallor.   EXTREMITIES: No cyanosis, no clubbing, no edema. No Deformity.  PSYCHIATRIC: Normal mood, affect and behavior.      Lab Results     Recent Results (from the past 240 hours)   INR    Collection Time: 06/03/25  7:09 AM   Result Value Ref Range    INR 2.37 (H) 0.85 - 1.15    PT 25.4 (H) 11.8 - 14.8 Seconds   INR    Collection Time: 06/10/25  5:56 AM   Result Value Ref Range    INR 2.66 (H) 0.85 - 1.15    PT 28.3 (H) 11.8 - 14.8 Seconds           Electronically signed by    Jesscia Cooper MD

## 2025-06-10 ENCOUNTER — RESULTS FOLLOW-UP (OUTPATIENT)
Dept: GERIATRICS | Facility: CLINIC | Age: 89
End: 2025-06-10

## 2025-06-10 ENCOUNTER — TRANSITIONAL CARE UNIT VISIT (OUTPATIENT)
Dept: GERIATRICS | Facility: CLINIC | Age: 89
End: 2025-06-10
Payer: COMMERCIAL

## 2025-06-10 VITALS
OXYGEN SATURATION: 95 % | BODY MASS INDEX: 18.06 KG/M2 | WEIGHT: 92 LBS | TEMPERATURE: 97.1 F | DIASTOLIC BLOOD PRESSURE: 46 MMHG | SYSTOLIC BLOOD PRESSURE: 110 MMHG | HEART RATE: 71 BPM | HEIGHT: 60 IN | RESPIRATION RATE: 18 BRPM

## 2025-06-10 DIAGNOSIS — S72.401D CLOSED FRACTURE OF DISTAL END OF RIGHT FEMUR WITH ROUTINE HEALING, UNSPECIFIED FRACTURE MORPHOLOGY, SUBSEQUENT ENCOUNTER: Primary | ICD-10-CM

## 2025-06-10 DIAGNOSIS — M97.01XD PERIPROSTHETIC FRACTURE AROUND INTERNAL PROSTHETIC RIGHT HIP JOINT, SUBSEQUENT ENCOUNTER: ICD-10-CM

## 2025-06-10 DIAGNOSIS — I10 ESSENTIAL HYPERTENSION: ICD-10-CM

## 2025-06-10 DIAGNOSIS — K21.9 GASTROESOPHAGEAL REFLUX DISEASE, UNSPECIFIED WHETHER ESOPHAGITIS PRESENT: ICD-10-CM

## 2025-06-10 DIAGNOSIS — Z95.2 S/P AVR (AORTIC VALVE REPLACEMENT): ICD-10-CM

## 2025-06-10 DIAGNOSIS — I83.019 VENOUS ULCER OF RIGHT LEG (H): ICD-10-CM

## 2025-06-10 DIAGNOSIS — I48.0 PAROXYSMAL ATRIAL FIBRILLATION (H): ICD-10-CM

## 2025-06-10 DIAGNOSIS — D62 ABLA (ACUTE BLOOD LOSS ANEMIA): ICD-10-CM

## 2025-06-10 DIAGNOSIS — L97.919 VENOUS ULCER OF RIGHT LEG (H): ICD-10-CM

## 2025-06-10 DIAGNOSIS — Z90.49 HISTORY OF PARTIAL COLECTOMY: ICD-10-CM

## 2025-06-10 PROBLEM — I83.018 VARICOSE VEINS OF RIGHT LOWER EXTREMITY WITH ULCER OTHER PART OF LOWER LEG (CODE) (H): Status: RESOLVED | Noted: 2025-02-28 | Resolved: 2025-06-10

## 2025-06-10 PROBLEM — L97.812 NON-PRESSURE CHRONIC ULCER OF OTHER PART OF RIGHT LOWER LEG WITH FAT LAYER EXPOSED (H): Status: RESOLVED | Noted: 2025-02-28 | Resolved: 2025-06-10

## 2025-06-10 LAB
INR PPP: 2.66 (ref 0.85–1.15)
PROTHROMBIN TIME: 28.3 SECONDS (ref 11.8–14.8)

## 2025-06-10 PROCEDURE — 99305 1ST NF CARE MODERATE MDM 35: CPT | Performed by: FAMILY MEDICINE

## 2025-06-10 NOTE — LETTER
6/10/2025      Modesta Tejeda  [address]  [facility] [id number]        [facility]       Patient Modesta Tejeda  MRN: [id number]        Reason for Visit     Chief Complaint   Patient presents with     Follow Up     INITIAL       Code Status     DNR only    Assessment/plan     Right distal femur periprosthetic fracture status post ORIF on 5/11/2025  Discharge nonweightbearing right lower extremity with knee immobilizer  Follow-up with ortho done and will continue to be nonweightbearing till seen further  Needs a roxanna    Traumatic hematoma of the right lower extremity   she has a chronic right lower extremity venous ulcer  Continue with the wound cares    ABLA requiring 3 units of packed RBCs  R/c Hg    GERD currently on  Pepcid    Paroxysmal A-fib  Started on metoprolol 12.5 twice daily  On warfarin for anticoagulation-INR is therapeutic at 2.6 today    Hyperlipidemia on simvastatin    Prior history of AVR with mechanical aortic valve on warfarin    Congestive heart failure with reduced ejection fraction with last echocardiogram showing EF of 35 to 40%.    Continue Lasix.  No edema noted on exam    Hypertension with frequent hypotension with low blood pressures taken off lisinopril in the hospital  Blood pressures are frequently low   nursing has been holding her metoprolol quite often    History of a prior colectomy with a colostomy    Generalized weakness discharged to the TCU for strengthening and rehab.  Continues to be quite debilitated needs a Roxanna for transfers    Pain is also rated as high and using oxycodone quite often  Last labs reviewed and essentially stable.      History     Patient is a very pleasant 89 year old female who is admitted to TCU.   patient was admitted post fall.  Noted to have a periprosthetic femur fracture.  Orthopedic consulted -she underwent ORIF on 5/11/2025  .  She is discharged nonweightbearing on the right lower extremity to the TCU.  She  needs a Roxanna rating her pain is high      Past Medical & Surgical History     Normocytic anemia  Paroxysmal atrial fibrillation  Status post AVR  Osteoporosis     Past Social History     Reviewed,  she reports she lives alone and is   No current history of smoking or alcohol use    Family History     Reviewed, and family history is not on file.    Medication List     Current Outpatient Medications   Medication Sig Dispense Refill     acetaminophen (TYLENOL) 500 MG tablet Take 1,000 mg by mouth 3 times daily.       aspirin 81 MG EC tablet Take 81 mg by mouth daily       Calcium Carb-Cholecalciferol (CALCIUM 500 + D) 500-3.125 MG-MCG TABS Take 1 tablet by mouth daily       famotidine (PEPCID) 40 MG tablet Take 40 mg by mouth daily       furosemide (LASIX) 20 MG tablet Take 20 mg by mouth daily.       lidocaine (LMX4) 4 % external cream Apply topically once as needed for mild pain.       methocarbamol (ROBAXIN) 500 MG tablet Take 250 mg by mouth 4 times daily as needed for muscle spasms.       metoprolol tartrate (LOPRESSOR) 25 MG tablet Take 12.5 mg by mouth 2 times daily.       nitroGLYcerin (NITROSTAT) 0.4 MG sublingual tablet Place 0.4 mg under the tongue every 5 minutes as needed for chest pain For chest pain place 1 tablet under the tongue every 5 minutes for 3 doses. If symptoms persist 5 minutes after 1st dose call 911.       oxyCODONE (ROXICODONE) 5 MG tablet Take 1 tablet (5 mg) by mouth every 6 hours as needed for severe pain. 30 tablet 0     polyethylene glycol (MIRALAX) 17 g packet Take 1 packet by mouth daily.       potassium chloride (KLOR-CON) 20 MEQ packet Take 40 mEq by mouth daily.       senna-docusate (SENOKOT-S/PERICOLACE) 8.6-50 MG tablet Take 1-2 tablets by mouth 2 times daily.       simvastatin (ZOCOR) 10 MG tablet Take 10 mg by mouth at bedtime       WARFARIN SODIUM PO 3/4/24 INR 3.18  Hold Warfarin on 3/4 then take 4mg daily.  Check INR this week with PCP or home care.    2/26/24 INR  2.11  Cont 4.5mg daily.  Next INR 3/4/24.    2/21/24 INR 2.45  Take 4.5mg daily.  Next INR 2/26/24.    2/19/24 INR 4.43  Hold Warfarin x 2 days.  Next INR 2/21/24.    2/12/24 INR 2.46  Cont 6mg daily.  Next INR 2/19/24.    2/8/24 INR 1.64  Take 6mg daily.  Next INR 2/12/24. (Patient taking differently: Take 6 mg by mouth daily. Next INR 6/10/25)       No current facility-administered medications for this visit.      MED REC REQUIRED  Post Medication Reconciliation Status:        Allergies     No Known Allergies    Review of Systems   A comprehensive review of 14 systems was done. Pertinent findings noted here and in history of present illness. All the rest negative.  Constitutional: Negative.  Negative for fever, chills, she has  activity change, appetite change and fatigue.   HENT: Negative for congestion and facial swelling.    Eyes: Negative for photophobia, redness and visual disturbance.   Respiratory: Negative for cough and chest tightness.    Cardiovascular: Negative for chest pain, palpitations and leg swelling.   Gastrointestinal: Negative for nausea, diarrhea, constipation, blood in stool and abdominal distention.   Genitourinary: Negative.    Musculoskeletal: Negative.  Cannot ambulate due to nonweightbearing status pain is high and using oxycodone nonhealing wound with a hematoma on the right leg noted  Skin: Negative.    Neurological: Negative for dizziness, tremors, syncope, weakness, light-headedness and headaches.   Hematological: Does not bruise/bleed easily.   Psychiatric/Behavioral: Negative.        Physical Exam   /46   Pulse 71   Temp 97.1  F (36.2  C)   Resp 18   Ht 1.524 m (5')   Wt 41.7 kg (92 lb)   SpO2 95%   BMI 17.97 kg/m       Constitutional: Oriented to person, place, and time and appears well-developed.   HEENT:  Normocephalic and atraumatic.  Eyes: Conjunctivae and EOM are normal. Pupils are equal, round, and reactive to light. No discharge.  No scleral icterus. Nose  normal. Mouth/Throat: Oropharynx is clear and moist. No oropharyngeal exudate.    NECK: Normal range of motion. Neck supple. No JVD present. No tracheal deviation present. No thyromegaly present.   CARDIOVASCULAR: Normal rate, regular rhythm and intact distal pulses.  Exam reveals no gallop and no friction rub.  Systolic murmur present.  PULMONARY: Effort normal and breath sounds normal. No respiratory distress.No Wheezing or rales.  ABDOMEN: Soft. Bowel sounds are normal. No distension and no mass.  There is no tenderness. There is no rebound and no guarding. No HSM.  She has a colostomy left lower quadrant  MUSCULOSKELETAL: Normal range of motion. Mild kyphosis, no tenderness.  Right lower extremity is in a knee immobilizer with an intact dressing  Wound on the right shin  LYMPH NODES: Has no cervical, supraclavicular, axillary and groin adenopathy.   NEUROLOGICAL: Alert and oriented to person, place, and time. No cranial nerve deficit.  Normal muscle tone. Coordination normal.   GENITOURINARY: Deferred exam.  SKIN: Skin is warm and dry. No rash noted. No erythema. No pallor.   EXTREMITIES: No cyanosis, no clubbing, no edema. No Deformity.  PSYCHIATRIC: Normal mood, affect and behavior.      Lab Results     Recent Results (from the past 240 hours)   INR    Collection Time: 06/03/25  7:09 AM   Result Value Ref Range    INR 2.37 (H) 0.85 - 1.15    PT 25.4 (H) 11.8 - 14.8 Seconds   INR    Collection Time: 06/10/25  5:56 AM   Result Value Ref Range    INR 2.66 (H) 0.85 - 1.15    PT 28.3 (H) 11.8 - 14.8 Seconds           Electronically signed by    Jessica Cooper MD                            Sincerely,        ALTA Silva    Electronically signed

## 2025-06-16 ENCOUNTER — LAB REQUISITION (OUTPATIENT)
Dept: LAB | Facility: CLINIC | Age: 89
End: 2025-06-16
Payer: COMMERCIAL

## 2025-06-16 DIAGNOSIS — I48.0 PAROXYSMAL ATRIAL FIBRILLATION (H): ICD-10-CM

## 2025-06-16 NOTE — PROGRESS NOTES
[facility]       Patient Modesta Tejeda  MRN: [id number]        Reason for Visit     Chief Complaint   Patient presents with    Follow Up    Discharge Summary Nursing Home       Code Status     DNR only    Assessment/plan     Right distal femur periprosthetic fracture status post ORIF on 5/11/2025  Discharge nonweightbearing right lower extremity with knee immobilizer  Follow-up with ortho done   Hinged knee brace x 4 weeks; rom exercises.  Cont NWB RLE    Traumatic hematoma of the right lower extremity   she has a chronic right lower extremity venous ulcer  Continue with the wound cares    ABLA requiring 3 units of packed RBCs  R/c Hg 11.6 On last check    GERD currently on  Pepcid    Paroxysmal A-fib  Started on metoprolol 12.5 twice daily  On warfarin for anticoagulation-INR is therapeutic at 2.7 today    Hyperlipidemia on simvastatin    Prior history of AVR with mechanical aortic valve on warfarin    Congestive heart failure with reduced ejection fraction with last echocardiogram showing EF of 35 to 40%.    Continue Lasix.  No edema noted on exam    Hypertension with frequent hypotension with low blood pressures taken off lisinopril in the hospital  Blood pressures are frequently low   nursing has been holding her metoprolol quite often  Reports this is baseline    History of a prior colectomy with a colostomy    Generalized weakness discharged to the TCU for strengthening and rehab.  Continues to be quite debilitated and remains wheelchair-bound  Family present at bedside and care plan reviewed they are transferring her to an assisted living  Outpatient follow-up with orthopedics as scheduled  Continue oxycodone for a few more weeks additional pain management   will DC her Robaxin though      History     Patient is a very pleasant 89 year old female who is admitted to TCU.   patient was admitted post fall.  Noted to have a periprosthetic femur fracture.  Orthopedic consulted -she underwent ORIF  on 5/11/2025  .  She is discharged nonweightbearing on the right lower extremity to the TCU.  She currently is wheelchair-bound  Patient needs oxycodone for pain  Blood pressures are chronically low  Family is planning on moving her to an [facility] to give her an enhanced level of care      Past Medical & Surgical History     Normocytic anemia  Paroxysmal atrial fibrillation  Status post AVR  Osteoporosis     Past Social History     Reviewed,  she reports she lives alone and is   No current history of smoking or alcohol use    Family History     Reviewed, and family history is not on file.    Medication List     Current Outpatient Medications   Medication Sig Dispense Refill    acetaminophen (TYLENOL) 500 MG tablet Take 1,000 mg by mouth 3 times daily.      aspirin 81 MG EC tablet Take 81 mg by mouth daily      Calcium Carb-Cholecalciferol (CALCIUM 500 + D) 500-3.125 MG-MCG TABS Take 1 tablet by mouth daily      famotidine (PEPCID) 40 MG tablet Take 40 mg by mouth daily      furosemide (LASIX) 20 MG tablet Take 20 mg by mouth daily.      lidocaine (LMX4) 4 % external cream Apply topically once as needed for mild pain.      metoprolol tartrate (LOPRESSOR) 25 MG tablet Take 12.5 mg by mouth 2 times daily.      nitroGLYcerin (NITROSTAT) 0.4 MG sublingual tablet Place 0.4 mg under the tongue every 5 minutes as needed for chest pain For chest pain place 1 tablet under the tongue every 5 minutes for 3 doses. If symptoms persist 5 minutes after 1st dose call 911.      oxyCODONE (ROXICODONE) 5 MG tablet Take 1 tablet (5 mg) by mouth every 6 hours as needed for severe pain. 30 tablet 0    polyethylene glycol (MIRALAX) 17 g packet Take 1 packet by mouth daily.      potassium chloride (KLOR-CON) 20 MEQ packet Take 40 mEq by mouth daily.      senna-docusate (SENOKOT-S/PERICOLACE) 8.6-50 MG tablet Take 1-2 tablets by mouth 2 times daily.      simvastatin (ZOCOR) 10 MG tablet Take 10 mg by mouth at bedtime      WARFARIN SODIUM  PO 3/4/24 INR 3.18  Hold Warfarin on 3/4 then take 4mg daily.  Check INR this week with PCP or home care.    2/26/24 INR 2.11  Cont 4.5mg daily.  Next INR 3/4/24.    2/21/24 INR 2.45  Take 4.5mg daily.  Next INR 2/26/24.    2/19/24 INR 4.43  Hold Warfarin x 2 days.  Next INR 2/21/24.    2/12/24 INR 2.46  Cont 6mg daily.  Next INR 2/19/24.    2/8/24 INR 1.64  Take 6mg daily.  Next INR 2/12/24. (Patient taking differently: Take 6 mg by mouth daily. Next INR 6/10/25)       No current facility-administered medications for this visit.      MED REC REQUIRED  Post Medication Reconciliation Status:        Allergies     No Known Allergies    Review of Systems   A comprehensive review of 14 systems was done. Pertinent findings noted here and in history of present illness. All the rest negative.  Constitutional: Negative.  Negative for fever, chills, she has  activity change, appetite change and fatigue.   HENT: Negative for congestion and facial swelling.    Eyes: Negative for photophobia, redness and visual disturbance.   Respiratory: Negative for cough and chest tightness.    Cardiovascular: Negative for chest pain, palpitations and leg swelling.   Gastrointestinal: Negative for nausea, diarrhea, constipation, blood in stool and abdominal distention.   Genitourinary: Negative.    Musculoskeletal: Negative.  Cannot ambulate due to nonweightbearing status pain is high and using oxycodone nonhealing wound with a hematoma on the right leg noted  Skin: Negative.    Neurological: Negative for dizziness, tremors, syncope, weakness, light-headedness and headaches.   Hematological: Does not bruise/bleed easily.   Psychiatric/Behavioral: Negative.        Physical Exam   /55   Pulse 88   Temp 97.4  F (36.3  C)   Resp 18   Ht 1.524 m (5')   Wt 42.2 kg (93 lb)   SpO2 98%   BMI 18.16 kg/m       Constitutional: Oriented to person, place, and time and appears well-developed.   HEENT:  Normocephalic and atraumatic.  Eyes:  Conjunctivae and EOM are normal. Pupils are equal, round, and reactive to light. No discharge.  No scleral icterus. Nose normal. Mouth/Throat: Oropharynx is clear and moist. No oropharyngeal exudate.    NECK: Normal range of motion. Neck supple. No JVD present. No tracheal deviation present. No thyromegaly present.   CARDIOVASCULAR: Normal rate, regular rhythm and intact distal pulses.  Exam reveals no gallop and no friction rub.  Systolic murmur present.  PULMONARY: Effort normal and breath sounds normal. No respiratory distress.No Wheezing or rales.  ABDOMEN: Soft. Bowel sounds are normal. No distension and no mass.  There is no tenderness. There is no rebound and no guarding. No HSM.  She has a colostomy left lower quadrant  MUSCULOSKELETAL: Normal range of motion. Mild kyphosis, no tenderness.  Right lower extremity is in a knee immobilizer with an intact dressing  Wound on the right shin  LYMPH NODES: Has no cervical, supraclavicular, axillary and groin adenopathy.   NEUROLOGICAL: Alert and oriented to person, place, and time. No cranial nerve deficit.  Normal muscle tone. Coordination normal.   GENITOURINARY: Deferred exam.  SKIN: Skin is warm and dry. No rash noted. No erythema. No pallor.   EXTREMITIES: No cyanosis, no clubbing, no edema. No Deformity.  PSYCHIATRIC: Normal mood, affect and behavior.      Lab Results     Recent Results (from the past 240 hours)   INR    Collection Time: 06/10/25  5:56 AM   Result Value Ref Range    INR 2.66 (H) 0.85 - 1.15    PT 28.3 (H) 11.8 - 14.8 Seconds   INR    Collection Time: 06/17/25  7:06 AM   Result Value Ref Range    INR 2.79 (H) 0.85 - 1.15    PT 28.8 (H) 11.8 - 14.8 Seconds       MEDICAL EQUIPMENT NEEDS:    Wheelchair  DISCHARGE PLAN/FACE TO FACE:  I certify that services are/were furnished while this patient was under the care of a physician and that a physician or an allowed non-physician practitioner (NPP), had a face-to-face encounter that meets the physician  face-to-face encounter requirements. The encounter was in whole, or in part, related to the primary reason for home health. The patient is confined to his/her home and needs intermittent skilled nursing, physical therapy, speech-language pathology, or the continued need for occupational therapy. A plan of care has been established by a physician and is periodically reviewed by a physician.  Date of Face-to-Face Encounter: 6/17/2025     I certify that, based on my findings, the following services are medically necessary home health services: [facility] HHA/RN and PT/OT to evaluate and treat    My clinical findings support the need for the above skilled services because: patient will be discharging to home. Patient will need assistance with medication management and performing IADLs and ADLs effectively and safely.     Patient to re-establish plan of care with their PCP within 7 days after leaving TCU.    Time spent is more than 35 minutes in this discharge visit.  All paperwork for admission to another facility were also done  Outpatient follow-up with orthopedics as scheduled for restoration of weightbearing status    Electronically signed by    Jessica Cooper MD

## 2025-06-17 ENCOUNTER — RESULTS FOLLOW-UP (OUTPATIENT)
Dept: GERIATRICS | Facility: CLINIC | Age: 89
End: 2025-06-17

## 2025-06-17 ENCOUNTER — TRANSITIONAL CARE UNIT VISIT (OUTPATIENT)
Dept: GERIATRICS | Facility: CLINIC | Age: 89
End: 2025-06-17
Payer: COMMERCIAL

## 2025-06-17 VITALS
DIASTOLIC BLOOD PRESSURE: 55 MMHG | HEART RATE: 88 BPM | WEIGHT: 93 LBS | OXYGEN SATURATION: 98 % | HEIGHT: 60 IN | BODY MASS INDEX: 18.26 KG/M2 | RESPIRATION RATE: 18 BRPM | TEMPERATURE: 97.4 F | SYSTOLIC BLOOD PRESSURE: 108 MMHG

## 2025-06-17 DIAGNOSIS — K21.9 GASTROESOPHAGEAL REFLUX DISEASE, UNSPECIFIED WHETHER ESOPHAGITIS PRESENT: ICD-10-CM

## 2025-06-17 DIAGNOSIS — I48.0 PAROXYSMAL ATRIAL FIBRILLATION (H): ICD-10-CM

## 2025-06-17 DIAGNOSIS — D62 ABLA (ACUTE BLOOD LOSS ANEMIA): ICD-10-CM

## 2025-06-17 DIAGNOSIS — Z93.3 COLOSTOMY PRESENT (H): ICD-10-CM

## 2025-06-17 DIAGNOSIS — S72.401D CLOSED FRACTURE OF DISTAL END OF RIGHT FEMUR WITH ROUTINE HEALING, UNSPECIFIED FRACTURE MORPHOLOGY, SUBSEQUENT ENCOUNTER: ICD-10-CM

## 2025-06-17 DIAGNOSIS — M97.01XD PERIPROSTHETIC FRACTURE AROUND INTERNAL PROSTHETIC RIGHT HIP JOINT, SUBSEQUENT ENCOUNTER: Primary | ICD-10-CM

## 2025-06-17 DIAGNOSIS — Z90.49 HISTORY OF PARTIAL COLECTOMY: ICD-10-CM

## 2025-06-17 DIAGNOSIS — I10 ESSENTIAL HYPERTENSION: ICD-10-CM

## 2025-06-17 DIAGNOSIS — Z95.2 S/P AVR (AORTIC VALVE REPLACEMENT): ICD-10-CM

## 2025-06-17 LAB
INR PPP: 2.79 (ref 0.85–1.15)
PROTHROMBIN TIME: 28.8 SECONDS (ref 11.8–14.8)

## 2025-06-17 PROCEDURE — 99316 NF DSCHRG MGMT 30 MIN+: CPT | Performed by: FAMILY MEDICINE

## 2025-06-17 NOTE — LETTER
6/17/2025      Modesta Tejeda  [address]  [place] [address]        [facility]       Patient Modesta Tejeda  MRN: [id number]        Reason for Visit     Chief Complaint   Patient presents with     Follow Up     Discharge Summary Nursing Home       Code Status     DNR only    Assessment/plan     Right distal femur periprosthetic fracture status post ORIF on 5/11/2025  Discharge nonweightbearing right lower extremity with knee immobilizer  Follow-up with ortho done   Hinged knee brace x 4 weeks; rom exercises.  Cont NWB RLE    Traumatic hematoma of the right lower extremity   she has a chronic right lower extremity venous ulcer  Continue with the wound cares    ABLA requiring 3 units of packed RBCs  R/c Hg 11.6 On last check    GERD currently on  Pepcid    Paroxysmal A-fib  Started on metoprolol 12.5 twice daily  On warfarin for anticoagulation-INR is therapeutic at 2.7 today    Hyperlipidemia on simvastatin    Prior history of AVR with mechanical aortic valve on warfarin    Congestive heart failure with reduced ejection fraction with last echocardiogram showing EF of 35 to 40%.    Continue Lasix.  No edema noted on exam    Hypertension with frequent hypotension with low blood pressures taken off lisinopril in the hospital  Blood pressures are frequently low   nursing has been holding her metoprolol quite often  Reports this is baseline    History of a prior colectomy with a colostomy    Generalized weakness discharged to the TCU for strengthening and rehab.  Continues to be quite debilitated and remains wheelchair-bound  Family present at bedside and care plan reviewed they are transferring her to an assisted living  Outpatient follow-up with orthopedics as scheduled  Continue oxycodone for a few more weeks additional pain management   will DC her Robaxin though      History     Patient is a very pleasant 89 year old female who is admitted to TCU.   patient was admitted  post fall.  Noted to have a periprosthetic femur fracture.  Orthopedic consulted -she underwent ORIF on 5/11/2025  .  She is discharged nonweightbearing on the right lower extremity to the TCU.  She currently is wheelchair-bound  Patient needs oxycodone for pain  Blood pressures are chronically low  Family is planning on moving her to an [facility] to give her an enhanced level of care      Past Medical & Surgical History     Normocytic anemia  Paroxysmal atrial fibrillation  Status post AVR  Osteoporosis     Past Social History     Reviewed,  she reports she lives alone and is   No current history of smoking or alcohol use    Family History     Reviewed, and family history is not on file.    Medication List     Current Outpatient Medications   Medication Sig Dispense Refill     acetaminophen (TYLENOL) 500 MG tablet Take 1,000 mg by mouth 3 times daily.       aspirin 81 MG EC tablet Take 81 mg by mouth daily       Calcium Carb-Cholecalciferol (CALCIUM 500 + D) 500-3.125 MG-MCG TABS Take 1 tablet by mouth daily       famotidine (PEPCID) 40 MG tablet Take 40 mg by mouth daily       furosemide (LASIX) 20 MG tablet Take 20 mg by mouth daily.       lidocaine (LMX4) 4 % external cream Apply topically once as needed for mild pain.       metoprolol tartrate (LOPRESSOR) 25 MG tablet Take 12.5 mg by mouth 2 times daily.       nitroGLYcerin (NITROSTAT) 0.4 MG sublingual tablet Place 0.4 mg under the tongue every 5 minutes as needed for chest pain For chest pain place 1 tablet under the tongue every 5 minutes for 3 doses. If symptoms persist 5 minutes after 1st dose call 911.       oxyCODONE (ROXICODONE) 5 MG tablet Take 1 tablet (5 mg) by mouth every 6 hours as needed for severe pain. 30 tablet 0     polyethylene glycol (MIRALAX) 17 g packet Take 1 packet by mouth daily.       potassium chloride (KLOR-CON) 20 MEQ packet Take 40 mEq by mouth daily.       senna-docusate (SENOKOT-S/PERICOLACE) 8.6-50 MG tablet Take 1-2 tablets by  mouth 2 times daily.       simvastatin (ZOCOR) 10 MG tablet Take 10 mg by mouth at bedtime       WARFARIN SODIUM PO 3/4/24 INR 3.18  Hold Warfarin on 3/4 then take 4mg daily.  Check INR this week with PCP or home care.    2/26/24 INR 2.11  Cont 4.5mg daily.  Next INR 3/4/24.    2/21/24 INR 2.45  Take 4.5mg daily.  Next INR 2/26/24.    2/19/24 INR 4.43  Hold Warfarin x 2 days.  Next INR 2/21/24.    2/12/24 INR 2.46  Cont 6mg daily.  Next INR 2/19/24.    2/8/24 INR 1.64  Take 6mg daily.  Next INR 2/12/24. (Patient taking differently: Take 6 mg by mouth daily. Next INR 6/10/25)       No current facility-administered medications for this visit.      MED REC REQUIRED  Post Medication Reconciliation Status:        Allergies     No Known Allergies    Review of Systems   A comprehensive review of 14 systems was done. Pertinent findings noted here and in history of present illness. All the rest negative.  Constitutional: Negative.  Negative for fever, chills, she has  activity change, appetite change and fatigue.   HENT: Negative for congestion and facial swelling.    Eyes: Negative for photophobia, redness and visual disturbance.   Respiratory: Negative for cough and chest tightness.    Cardiovascular: Negative for chest pain, palpitations and leg swelling.   Gastrointestinal: Negative for nausea, diarrhea, constipation, blood in stool and abdominal distention.   Genitourinary: Negative.    Musculoskeletal: Negative.  Cannot ambulate due to nonweightbearing status pain is high and using oxycodone nonhealing wound with a hematoma on the right leg noted  Skin: Negative.    Neurological: Negative for dizziness, tremors, syncope, weakness, light-headedness and headaches.   Hematological: Does not bruise/bleed easily.   Psychiatric/Behavioral: Negative.        Physical Exam   /55   Pulse 88   Temp 97.4  F (36.3  C)   Resp 18   Ht 1.524 m (5')   Wt 42.2 kg (93 lb)   SpO2 98%   BMI 18.16 kg/m       Constitutional:  Oriented to person, place, and time and appears well-developed.   HEENT:  Normocephalic and atraumatic.  Eyes: Conjunctivae and EOM are normal. Pupils are equal, round, and reactive to light. No discharge.  No scleral icterus. Nose normal. Mouth/Throat: Oropharynx is clear and moist. No oropharyngeal exudate.    NECK: Normal range of motion. Neck supple. No JVD present. No tracheal deviation present. No thyromegaly present.   CARDIOVASCULAR: Normal rate, regular rhythm and intact distal pulses.  Exam reveals no gallop and no friction rub.  Systolic murmur present.  PULMONARY: Effort normal and breath sounds normal. No respiratory distress.No Wheezing or rales.  ABDOMEN: Soft. Bowel sounds are normal. No distension and no mass.  There is no tenderness. There is no rebound and no guarding. No HSM.  She has a colostomy left lower quadrant  MUSCULOSKELETAL: Normal range of motion. Mild kyphosis, no tenderness.  Right lower extremity is in a knee immobilizer with an intact dressing  Wound on the right shin  LYMPH NODES: Has no cervical, supraclavicular, axillary and groin adenopathy.   NEUROLOGICAL: Alert and oriented to person, place, and time. No cranial nerve deficit.  Normal muscle tone. Coordination normal.   GENITOURINARY: Deferred exam.  SKIN: Skin is warm and dry. No rash noted. No erythema. No pallor.   EXTREMITIES: No cyanosis, no clubbing, no edema. No Deformity.  PSYCHIATRIC: Normal mood, affect and behavior.      Lab Results     Recent Results (from the past 240 hours)   INR    Collection Time: 06/10/25  5:56 AM   Result Value Ref Range    INR 2.66 (H) 0.85 - 1.15    PT 28.3 (H) 11.8 - 14.8 Seconds   INR    Collection Time: 06/17/25  7:06 AM   Result Value Ref Range    INR 2.79 (H) 0.85 - 1.15    PT 28.8 (H) 11.8 - 14.8 Seconds       MEDICAL EQUIPMENT NEEDS:    Wheelchair  DISCHARGE PLAN/FACE TO FACE:  I certify that services are/were furnished while this patient was under the care of a physician and that a  physician or an allowed non-physician practitioner (NPP), had a face-to-face encounter that meets the physician face-to-face encounter requirements. The encounter was in whole, or in part, related to the primary reason for home health. The patient is confined to his/her home and needs intermittent skilled nursing, physical therapy, speech-language pathology, or the continued need for occupational therapy. A plan of care has been established by a physician and is periodically reviewed by a physician.  Date of Face-to-Face Encounter: 6/17/2025     I certify that, based on my findings, the following services are medically necessary home health services: [facility] HHA/RN and PT/OT to evaluate and treat    My clinical findings support the need for the above skilled services because: patient will be discharging to home. Patient will need assistance with medication management and performing IADLs and ADLs effectively and safely.     Patient to re-establish plan of care with their PCP within 7 days after leaving TCU.    Time spent is more than 35 minutes in this discharge visit.  All paperwork for admission to another facility were also done  Outpatient follow-up with orthopedics as scheduled for restoration of weightbearing status    Electronically signed by    Jessica Cooper MD                            Sincerely,        ALTA Silva    Electronically signed